# Patient Record
Sex: FEMALE | Race: BLACK OR AFRICAN AMERICAN | Employment: FULL TIME | URBAN - METROPOLITAN AREA
[De-identification: names, ages, dates, MRNs, and addresses within clinical notes are randomized per-mention and may not be internally consistent; named-entity substitution may affect disease eponyms.]

---

## 2010-10-27 LAB — MAMMOGRAPHY, EXTERNAL: NORMAL

## 2017-08-23 ENCOUNTER — OFFICE VISIT (OUTPATIENT)
Dept: FAMILY MEDICINE CLINIC | Age: 47
End: 2017-08-23

## 2017-08-23 VITALS
DIASTOLIC BLOOD PRESSURE: 78 MMHG | HEIGHT: 63 IN | WEIGHT: 184 LBS | HEART RATE: 61 BPM | SYSTOLIC BLOOD PRESSURE: 126 MMHG | OXYGEN SATURATION: 98 % | BODY MASS INDEX: 32.6 KG/M2

## 2017-08-23 DIAGNOSIS — G89.29 CHRONIC RIGHT-SIDED LOW BACK PAIN WITH RIGHT-SIDED SCIATICA: Primary | ICD-10-CM

## 2017-08-23 DIAGNOSIS — M25.561 CHRONIC PAIN OF RIGHT KNEE: ICD-10-CM

## 2017-08-23 DIAGNOSIS — G43.009 MIGRAINE WITHOUT AURA AND WITHOUT STATUS MIGRAINOSUS, NOT INTRACTABLE: ICD-10-CM

## 2017-08-23 DIAGNOSIS — M54.41 CHRONIC RIGHT-SIDED LOW BACK PAIN WITH RIGHT-SIDED SCIATICA: Primary | ICD-10-CM

## 2017-08-23 DIAGNOSIS — G89.29 CHRONIC PAIN OF RIGHT KNEE: ICD-10-CM

## 2017-08-23 PROCEDURE — 99204 OFFICE O/P NEW MOD 45 MIN: CPT | Performed by: FAMILY MEDICINE

## 2017-08-23 RX ORDER — SUMATRIPTAN 100 MG/1
100 TABLET, FILM COATED ORAL
Qty: 9 TABLET | Refills: 2 | Status: SHIPPED | OUTPATIENT
Start: 2017-08-23 | End: 2017-09-29 | Stop reason: SDUPTHER

## 2017-08-23 RX ORDER — METHYLPREDNISOLONE 4 MG/1
TABLET ORAL
Qty: 21 TABLET | Refills: 0 | Status: SHIPPED | OUTPATIENT
Start: 2017-08-23 | End: 2017-09-29

## 2017-08-23 RX ORDER — COVID-19 ANTIGEN TEST
3 KIT MISCELLANEOUS DAILY
COMMUNITY

## 2017-08-23 ASSESSMENT — PATIENT HEALTH QUESTIONNAIRE - PHQ9
2. FEELING DOWN, DEPRESSED OR HOPELESS: 0
SUM OF ALL RESPONSES TO PHQ9 QUESTIONS 1 & 2: 0
1. LITTLE INTEREST OR PLEASURE IN DOING THINGS: 0
SUM OF ALL RESPONSES TO PHQ QUESTIONS 1-9: 0

## 2017-08-23 ASSESSMENT — ENCOUNTER SYMPTOMS
SORE THROAT: 0
EYE ITCHING: 1
TROUBLE SWALLOWING: 0
SINUS PRESSURE: 1
GASTROINTESTINAL NEGATIVE: 1
BACK PAIN: 1
COUGH: 0

## 2017-09-06 ENCOUNTER — OFFICE VISIT (OUTPATIENT)
Dept: INTERNAL MEDICINE CLINIC | Age: 47
End: 2017-09-06

## 2017-09-06 VITALS
DIASTOLIC BLOOD PRESSURE: 70 MMHG | HEART RATE: 60 BPM | SYSTOLIC BLOOD PRESSURE: 120 MMHG | BODY MASS INDEX: 32.82 KG/M2 | TEMPERATURE: 98.4 F | HEIGHT: 63 IN | WEIGHT: 185.2 LBS

## 2017-09-06 DIAGNOSIS — R31.9 HEMATURIA: Primary | ICD-10-CM

## 2017-09-06 DIAGNOSIS — N30.01 ACUTE CYSTITIS WITH HEMATURIA: ICD-10-CM

## 2017-09-06 LAB
BILIRUBIN, POC: NORMAL
BLOOD URINE, POC: NORMAL
CLARITY, POC: NORMAL
COLOR, POC: YELLOW
GLUCOSE URINE, POC: NORMAL
KETONES, POC: NORMAL
LEUKOCYTE EST, POC: NORMAL
NITRITE, POC: NORMAL
PH, POC: 6
PROTEIN, POC: NORMAL
SPECIFIC GRAVITY, POC: 1005
UROBILINOGEN, POC: 0.2

## 2017-09-06 PROCEDURE — 81002 URINALYSIS NONAUTO W/O SCOPE: CPT | Performed by: NURSE PRACTITIONER

## 2017-09-06 PROCEDURE — 99213 OFFICE O/P EST LOW 20 MIN: CPT | Performed by: NURSE PRACTITIONER

## 2017-09-06 RX ORDER — FLUCONAZOLE 150 MG/1
150 TABLET ORAL ONCE
Qty: 1 TABLET | Refills: 0 | Status: SHIPPED | OUTPATIENT
Start: 2017-09-06 | End: 2017-09-06

## 2017-09-06 RX ORDER — CIPROFLOXACIN 250 MG/1
250 TABLET, FILM COATED ORAL 2 TIMES DAILY
Qty: 6 TABLET | Refills: 0 | Status: SHIPPED | OUTPATIENT
Start: 2017-09-06 | End: 2017-09-09

## 2017-09-06 ASSESSMENT — ENCOUNTER SYMPTOMS
APNEA: 0
SHORTNESS OF BREATH: 0
RESPIRATORY NEGATIVE: 1

## 2017-09-29 ENCOUNTER — OFFICE VISIT (OUTPATIENT)
Dept: FAMILY MEDICINE CLINIC | Age: 47
End: 2017-09-29

## 2017-09-29 VITALS
OXYGEN SATURATION: 98 % | WEIGHT: 181.2 LBS | HEART RATE: 68 BPM | BODY MASS INDEX: 32.11 KG/M2 | HEIGHT: 63 IN | SYSTOLIC BLOOD PRESSURE: 124 MMHG | DIASTOLIC BLOOD PRESSURE: 82 MMHG

## 2017-09-29 DIAGNOSIS — G43.009 MIGRAINE WITHOUT AURA AND WITHOUT STATUS MIGRAINOSUS, NOT INTRACTABLE: Primary | ICD-10-CM

## 2017-09-29 PROCEDURE — 99213 OFFICE O/P EST LOW 20 MIN: CPT | Performed by: FAMILY MEDICINE

## 2017-09-29 RX ORDER — SUMATRIPTAN 100 MG/1
100 TABLET, FILM COATED ORAL
Qty: 9 TABLET | Refills: 2 | Status: SHIPPED | OUTPATIENT
Start: 2017-09-29 | End: 2018-01-23 | Stop reason: ALTCHOICE

## 2017-10-10 ENCOUNTER — TELEPHONE (OUTPATIENT)
Dept: FAMILY MEDICINE CLINIC | Age: 47
End: 2017-10-10

## 2017-10-10 NOTE — TELEPHONE ENCOUNTER
Pt called to see if there is any way the FMLA forms can be filled out asap because they need to be turned in. They have been trying to fax them for over a week now but due to the fax machine being down it was received the first time it was faxed.

## 2017-10-12 ENCOUNTER — OFFICE VISIT (OUTPATIENT)
Dept: FAMILY MEDICINE CLINIC | Age: 47
End: 2017-10-12

## 2017-10-12 VITALS
SYSTOLIC BLOOD PRESSURE: 120 MMHG | DIASTOLIC BLOOD PRESSURE: 80 MMHG | WEIGHT: 180 LBS | HEART RATE: 47 BPM | OXYGEN SATURATION: 99 % | BODY MASS INDEX: 31.89 KG/M2 | HEIGHT: 63 IN

## 2017-10-12 DIAGNOSIS — J06.9 VIRAL URI: Primary | ICD-10-CM

## 2017-10-12 DIAGNOSIS — G43.009 MIGRAINE WITHOUT AURA AND WITHOUT STATUS MIGRAINOSUS, NOT INTRACTABLE: ICD-10-CM

## 2017-10-12 PROCEDURE — 99214 OFFICE O/P EST MOD 30 MIN: CPT | Performed by: FAMILY MEDICINE

## 2017-10-12 RX ORDER — FLUTICASONE PROPIONATE 50 MCG
2 SPRAY, SUSPENSION (ML) NASAL DAILY
Qty: 1 BOTTLE | Refills: 3 | Status: SHIPPED | OUTPATIENT
Start: 2017-10-12 | End: 2020-07-23

## 2017-10-12 NOTE — PROGRESS NOTES
Nails show no clubbing. Psychiatric: She has a normal mood and affect. Her behavior is normal.       Assessment:      1. Viral URI     2. Migraine without aura and without status migrainosus, not intractable            Plan:      1. Recommendations for viral upper respiratory infection given, including fluids, rest, salt water gargles, prn acetaminophen or ibuprofen for myalgias. Call back if new symptoms (especially high fever or shortness of breath) or worsening after a week.   2. Stable on current meds  fmla ppw filled out

## 2017-10-12 NOTE — LETTER
[facility] [address]  Phone: [phone]  Fax: [phone]    Martínez Harmon MD        October 12, 2017     Patient: Hailey Wallace   YOB: 1970   Date of Visit: 10/12/2017       To Whom It May Concern: It is my medical opinion that Hailey Wallace should be excused from work Monday through Friday due to illness. She may return 10/16/17  If you have any questions or concerns, please don't hesitate to call.     Sincerely,         Martínez Harmon MD

## 2017-10-13 ENCOUNTER — TELEPHONE (OUTPATIENT)
Dept: FAMILY MEDICINE CLINIC | Age: 47
End: 2017-10-13

## 2017-10-13 NOTE — TELEPHONE ENCOUNTER
Pt called to say [employer] didn't receive the original fax from 10/12. I re-faxed along with the documentation of when it was faxed.

## 2017-10-16 ENCOUNTER — TELEPHONE (OUTPATIENT)
Dept: FAMILY MEDICINE CLINIC | Age: 47
End: 2017-10-16

## 2017-10-28 LAB
BASOPHILS ABSOLUTE: NORMAL /ΜL
BASOPHILS RELATIVE PERCENT: NORMAL %
BUN BLDV-MCNC: 12 MG/DL
CALCIUM SERPL-MCNC: 9.8 MG/DL
CHLORIDE BLD-SCNC: 104 MMOL/L
CHOLESTEROL, TOTAL: 185 MG/DL
CHOLESTEROL/HDL RATIO: 3
CO2: NORMAL MMOL/L
CREAT SERPL-MCNC: 0.82 MG/DL
EOSINOPHILS ABSOLUTE: NORMAL /ΜL
EOSINOPHILS RELATIVE PERCENT: NORMAL %
GFR CALCULATED: NORMAL
GLUCOSE BLD-MCNC: 91 MG/DL
HCT VFR BLD CALC: 37.8 % (ref 36–46)
HDLC SERPL-MCNC: 61 MG/DL (ref 35–70)
HEMOGLOBIN: 13.6 G/DL (ref 12–16)
LDL CHOLESTEROL CALCULATED: 111 MG/DL (ref 0–160)
LYMPHOCYTES ABSOLUTE: NORMAL /ΜL
LYMPHOCYTES RELATIVE PERCENT: NORMAL %
MCH RBC QN AUTO: 28.2 PG
MCHC RBC AUTO-ENTMCNC: 36 G/DL
MCV RBC AUTO: 78 FL
MONOCYTES ABSOLUTE: NORMAL /ΜL
MONOCYTES RELATIVE PERCENT: NORMAL %
NEUTROPHILS ABSOLUTE: NORMAL /ΜL
NEUTROPHILS RELATIVE PERCENT: NORMAL %
PLATELET # BLD: 214 K/ΜL
PMV BLD AUTO: NORMAL FL
POTASSIUM SERPL-SCNC: 4.5 MMOL/L
RBC # BLD: NORMAL 10^6/ΜL
SODIUM BLD-SCNC: 143 MMOL/L
TRIGL SERPL-MCNC: 63 MG/DL
VLDLC SERPL CALC-MCNC: 13 MG/DL
WBC # BLD: 3.5 10^3/ML

## 2018-01-23 ENCOUNTER — OFFICE VISIT (OUTPATIENT)
Dept: FAMILY MEDICINE CLINIC | Age: 48
End: 2018-01-23

## 2018-01-23 VITALS
DIASTOLIC BLOOD PRESSURE: 82 MMHG | HEART RATE: 68 BPM | BODY MASS INDEX: 32.74 KG/M2 | OXYGEN SATURATION: 98 % | HEIGHT: 63 IN | RESPIRATION RATE: 12 BRPM | SYSTOLIC BLOOD PRESSURE: 126 MMHG | WEIGHT: 184.8 LBS

## 2018-01-23 DIAGNOSIS — M25.561 ACUTE PAIN OF RIGHT KNEE: Primary | ICD-10-CM

## 2018-01-23 PROCEDURE — 99214 OFFICE O/P EST MOD 30 MIN: CPT | Performed by: NURSE PRACTITIONER

## 2018-01-23 ASSESSMENT — ENCOUNTER SYMPTOMS
SINUS PRESSURE: 0
BACK PAIN: 0
DIARRHEA: 0
EYE PAIN: 0
APNEA: 0
EYE REDNESS: 0
CONSTIPATION: 0
WHEEZING: 0
SHORTNESS OF BREATH: 0
COUGH: 0
NAUSEA: 0
ABDOMINAL PAIN: 0
CHEST TIGHTNESS: 0
VOMITING: 0
RHINORRHEA: 0
BLOOD IN STOOL: 0
ABDOMINAL DISTENTION: 0

## 2018-01-23 NOTE — PROGRESS NOTES
No acute abnormality of the knee. Review of Systems   Constitutional: Negative for appetite change, chills, fatigue and fever. HENT: Negative for congestion, ear pain, rhinorrhea and sinus pressure. Eyes: Negative for pain, redness and visual disturbance. Respiratory: Negative for apnea, cough, chest tightness, shortness of breath and wheezing. Cardiovascular: Negative for chest pain, palpitations and leg swelling. Gastrointestinal: Negative for abdominal distention, abdominal pain, blood in stool, constipation, diarrhea, nausea and vomiting. Endocrine: Negative for cold intolerance, heat intolerance, polydipsia, polyphagia and polyuria. Genitourinary: Negative for difficulty urinating, dysuria, enuresis, frequency, hematuria and urgency. Musculoskeletal: Positive for arthralgias (right knee ). Negative for back pain, gait problem, joint swelling and neck pain. Skin: Negative for rash and wound. Allergic/Immunologic: Negative for environmental allergies, food allergies and immunocompromised state. Neurological: Negative for dizziness, tingling, syncope, light-headedness, numbness and headaches. Hematological: Negative for adenopathy. Does not bruise/bleed easily. Psychiatric/Behavioral: Negative for agitation, behavioral problems and confusion. The patient is not nervous/anxious. Physical Exam   Constitutional: She is oriented to person, place, and time. She appears well-developed and well-nourished. No distress. HENT:   Head: Normocephalic and atraumatic. Eyes: Conjunctivae are normal.   Neck: Normal range of motion. Neck supple. No tracheal deviation present. Cardiovascular: Normal rate, regular rhythm, normal heart sounds and intact distal pulses. Exam reveals no gallop and no friction rub. No murmur heard. Pulmonary/Chest: Effort normal and breath sounds normal. No respiratory distress. She has no wheezes. She has no rales. She exhibits no tenderness.

## 2018-01-24 ENCOUNTER — OFFICE VISIT (OUTPATIENT)
Dept: ORTHOPEDIC SURGERY | Age: 48
End: 2018-01-24

## 2018-01-24 VITALS
WEIGHT: 184 LBS | BODY MASS INDEX: 32.6 KG/M2 | HEIGHT: 63 IN | DIASTOLIC BLOOD PRESSURE: 82 MMHG | SYSTOLIC BLOOD PRESSURE: 119 MMHG | HEART RATE: 61 BPM

## 2018-01-24 DIAGNOSIS — M17.11 PRIMARY OSTEOARTHRITIS OF RIGHT KNEE: Primary | ICD-10-CM

## 2018-01-24 DIAGNOSIS — M25.561 RIGHT KNEE PAIN, UNSPECIFIED CHRONICITY: ICD-10-CM

## 2018-01-24 PROCEDURE — L1833 KO ADJ JNT POS R SUP PRE OTS: HCPCS | Performed by: ORTHOPAEDIC SURGERY

## 2018-01-24 PROCEDURE — 99243 OFF/OP CNSLTJ NEW/EST LOW 30: CPT | Performed by: ORTHOPAEDIC SURGERY

## 2018-01-24 RX ORDER — MELOXICAM 15 MG/1
15 TABLET ORAL DAILY
Qty: 30 TABLET | Refills: 1 | Status: SHIPPED | OUTPATIENT
Start: 2018-01-24 | End: 2020-07-23 | Stop reason: ALTCHOICE

## 2018-01-24 NOTE — PROGRESS NOTES
Lawerence Severs  [id number]  January 24, 2018    Chief Complaint   Patient presents with    Knee Pain     right knee pain since she woke up with swelling 1/19/17. Pain score 3/10       History: The patient is a 78-year-old female here today for evaluation regarding her right knee pain. She apparently went jogging on 1/17/18 and noted some swelling and pain in the knee approximately 2 days afterwards. She presented to the ER and was given a knee immobilizer after having x-rays taken. She reports this has provided her some support. She rates her knee pain 3/10 today. She has noted some aching discomfort and stiffness for the last few months after getting up from a seated position. 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 She has been taking 800 mg of ibuprofen at night with some relief. She denies prior formal treatments for this issue. This is a consult for right knee pain from Sunny Pereira CNP. The patient's  past medical history, medications, allergies,  family history, social history, and have been reviewed, and dated and are recorded in the chart. Pertinent items are noted in HPI. Review of systems reviewed from Pertinent History Form dated on 1/24/18 and available in the patient's chart under the Media tab. Vitals:  /82   Pulse 61   Ht 5' 3\" (1.6 m)   Wt 184 lb (83.5 kg)   Breastfeeding? No   BMI 32.59 kg/m²     Physical: Ms. Lawerence Severs appears well, she is in no apparent distress, she demonstrates appropriate mood & affect. She is alert and oriented to person, place and time. Examination of the right lower extremity reveals no pain with range of motion of the hip. She has generalized tenderness to palpation about the medial joint line of the right knee. Range of motion is from 0 degrees to 125 degrees. Strength is 4+ to 5/5 for all muscle groups about the right knee.  There is patellofemoral crepitus with range of motion of the right knee. Varus and valgus stressing of the knees reveals no evidence of instability. There is a trace effusion in the right knee. Anterior drawer and Lachman are negative bilaterally. Examination of the skin reveals no rashes, ulceration, or lesion, bilaterally in the lower extremities. Sensation to both lower extremities is grossly intact. Exam of both feet reveals pedal pulses intact and brisk cap refill. Patient is able to dorsiflex and wiggle all toes. Deep tendon reflexes of the lower extremities are normal and symmetric. Imagin views of the right knee obtained in the office today and additional 2 views obtained previously in the ER were reviewed. There is evidence of moderate osteoarthritis of the right knee. The changes are most severe within the medial compartment. Impression: #1 right knee osteoarthritis    Plan: She was instructed on activity modification and low impact exercising: Natural history and expected course discussed. Questions answered. Rest, ice, compression, and elevation (RICE) therapy. Reduction in offending activity. The patient was fitted for a short runner knee brace in the office today. [facility]. OTC analgesics as needed. If the patient continues to have pain, we will consider a cortisone injection in 4-6 weeks. Procedures    Breg Short Runner WrapAround Knee Brace     Patient was prescribed a Breg Shortrunner. The right knee will require stabilization / immobilization from this semi-rigid / rigid orthosis to improve their function. The orthosis will assist in protecting the affected area, provide functional support and facilitate healing. The patient was educated and fit by a healthcare professional with expert knowledge and specialization in brace application while under the direct supervision of the physician. Verbal and written instructions for the use of and application of this item were provided.    They were

## 2018-01-25 ENCOUNTER — TELEPHONE (OUTPATIENT)
Dept: ORTHOPEDIC SURGERY | Age: 48
End: 2018-01-25

## 2018-01-25 NOTE — TELEPHONE ENCOUNTER
1/25/18  [facility]   - NOT COVERED ITEM ON PATIENT'S POLICY - PER Alok Sánchez @ [facility]   - REF #[id number]  - NDS

## 2018-01-29 ENCOUNTER — TELEPHONE (OUTPATIENT)
Dept: FAMILY MEDICINE CLINIC | Age: 48
End: 2018-01-29

## 2018-01-30 ENCOUNTER — TELEPHONE (OUTPATIENT)
Dept: FAMILY MEDICINE CLINIC | Age: 48
End: 2018-01-30

## 2018-02-01 NOTE — TELEPHONE ENCOUNTER
I did not write a letter for the patient. This is the first time this message has been routed to me. Patient received a letter from [facility] excusing her from work starting 1/19/18 and returning to work on 1/29/18.  Are you approving patient to be off an additional 2 days, the 29th & 30th?

## 2018-02-01 NOTE — TELEPHONE ENCOUNTER
Patient also stated that she was at work so she didn't have time to argue with me, that she would call back later.

## 2018-02-06 ENCOUNTER — TELEPHONE (OUTPATIENT)
Dept: FAMILY MEDICINE CLINIC | Age: 48
End: 2018-02-06

## 2018-02-07 ENCOUNTER — HOSPITAL ENCOUNTER (OUTPATIENT)
Dept: OTHER | Age: 48
Discharge: HOME | End: 2018-02-28
Attending: NURSE PRACTITIONER | Admitting: NURSE PRACTITIONER

## 2018-02-07 ENCOUNTER — HOSPITAL ENCOUNTER (OUTPATIENT)
Dept: OTHER | Age: 48
Discharge: HOME | End: 2018-01-30
Attending: NURSE PRACTITIONER | Admitting: NURSE PRACTITIONER

## 2018-03-01 ENCOUNTER — HOSPITAL ENCOUNTER (OUTPATIENT)
Dept: OTHER | Age: 48
Discharge: HOME | End: 2018-03-31
Attending: NURSE PRACTITIONER | Admitting: NURSE PRACTITIONER

## 2018-05-22 ENCOUNTER — TELEPHONE (OUTPATIENT)
Dept: FAMILY MEDICINE CLINIC | Age: 48
End: 2018-05-22

## 2019-01-30 ENCOUNTER — TELEPHONE (OUTPATIENT)
Dept: FAMILY MEDICINE CLINIC | Age: 49
End: 2019-01-30

## 2019-02-13 ENCOUNTER — OFFICE VISIT (OUTPATIENT)
Dept: FAMILY MEDICINE CLINIC | Age: 49
End: 2019-02-13
Payer: COMMERCIAL

## 2019-02-13 VITALS
DIASTOLIC BLOOD PRESSURE: 80 MMHG | HEART RATE: 64 BPM | SYSTOLIC BLOOD PRESSURE: 128 MMHG | HEIGHT: 63 IN | WEIGHT: 193.6 LBS | BODY MASS INDEX: 34.3 KG/M2 | OXYGEN SATURATION: 96 %

## 2019-02-13 DIAGNOSIS — G43.009 MIGRAINE WITHOUT AURA AND WITHOUT STATUS MIGRAINOSUS, NOT INTRACTABLE: Primary | ICD-10-CM

## 2019-02-13 PROCEDURE — 99213 OFFICE O/P EST LOW 20 MIN: CPT | Performed by: FAMILY MEDICINE

## 2019-02-13 RX ORDER — TOPIRAMATE 25 MG/1
50 TABLET ORAL 2 TIMES DAILY
Qty: 120 TABLET | Refills: 3 | Status: SHIPPED | OUTPATIENT
Start: 2019-02-13 | End: 2019-10-23

## 2019-02-13 RX ORDER — RIZATRIPTAN BENZOATE 10 MG/1
10 TABLET, ORALLY DISINTEGRATING ORAL
Qty: 12 TABLET | Refills: 3 | Status: SHIPPED | OUTPATIENT
Start: 2019-02-13 | End: 2020-07-23 | Stop reason: ALTCHOICE

## 2019-02-13 ASSESSMENT — ENCOUNTER SYMPTOMS: TROUBLE SWALLOWING: 0

## 2019-02-15 ENCOUNTER — TELEPHONE (OUTPATIENT)
Dept: FAMILY MEDICINE CLINIC | Age: 49
End: 2019-02-15

## 2019-02-15 RX ORDER — TOPIRAMATE 50 MG/1
50 TABLET, FILM COATED ORAL 2 TIMES DAILY
Qty: 180 TABLET | Refills: 3 | Status: SHIPPED | OUTPATIENT
Start: 2019-02-15 | End: 2019-10-23

## 2019-02-27 ENCOUNTER — TELEPHONE (OUTPATIENT)
Dept: FAMILY MEDICINE CLINIC | Age: 49
End: 2019-02-27

## 2019-03-27 ENCOUNTER — OFFICE VISIT (OUTPATIENT)
Dept: FAMILY MEDICINE CLINIC | Age: 49
End: 2019-03-27
Payer: COMMERCIAL

## 2019-03-27 VITALS
WEIGHT: 197.4 LBS | SYSTOLIC BLOOD PRESSURE: 120 MMHG | HEART RATE: 67 BPM | BODY MASS INDEX: 34.98 KG/M2 | DIASTOLIC BLOOD PRESSURE: 84 MMHG | HEIGHT: 63 IN | OXYGEN SATURATION: 98 %

## 2019-03-27 DIAGNOSIS — G43.009 MIGRAINE WITHOUT AURA AND WITHOUT STATUS MIGRAINOSUS, NOT INTRACTABLE: Primary | ICD-10-CM

## 2019-03-27 DIAGNOSIS — R09.81 NASAL CONGESTION: ICD-10-CM

## 2019-03-27 PROCEDURE — 99214 OFFICE O/P EST MOD 30 MIN: CPT | Performed by: FAMILY MEDICINE

## 2019-03-27 ASSESSMENT — PATIENT HEALTH QUESTIONNAIRE - PHQ9
SUM OF ALL RESPONSES TO PHQ QUESTIONS 1-9: 0
SUM OF ALL RESPONSES TO PHQ9 QUESTIONS 1 & 2: 0
2. FEELING DOWN, DEPRESSED OR HOPELESS: 0
1. LITTLE INTEREST OR PLEASURE IN DOING THINGS: 0
SUM OF ALL RESPONSES TO PHQ QUESTIONS 1-9: 0

## 2019-10-23 ENCOUNTER — OFFICE VISIT (OUTPATIENT)
Dept: FAMILY MEDICINE CLINIC | Age: 49
End: 2019-10-23
Payer: COMMERCIAL

## 2019-10-23 VITALS
SYSTOLIC BLOOD PRESSURE: 118 MMHG | HEIGHT: 63 IN | WEIGHT: 196 LBS | DIASTOLIC BLOOD PRESSURE: 72 MMHG | BODY MASS INDEX: 34.73 KG/M2 | OXYGEN SATURATION: 99 % | HEART RATE: 76 BPM

## 2019-10-23 DIAGNOSIS — B96.89 ACUTE BACTERIAL SINUSITIS: Primary | ICD-10-CM

## 2019-10-23 DIAGNOSIS — J01.90 ACUTE BACTERIAL SINUSITIS: Primary | ICD-10-CM

## 2019-10-23 PROCEDURE — 99213 OFFICE O/P EST LOW 20 MIN: CPT | Performed by: FAMILY MEDICINE

## 2019-10-23 RX ORDER — AMOXICILLIN AND CLAVULANATE POTASSIUM 875; 125 MG/1; MG/1
1 TABLET, FILM COATED ORAL 2 TIMES DAILY
Qty: 20 TABLET | Refills: 0 | Status: SHIPPED | OUTPATIENT
Start: 2019-10-23 | End: 2019-11-02

## 2019-10-23 RX ORDER — METHYLPREDNISOLONE 4 MG/1
TABLET ORAL
Qty: 21 TABLET | Refills: 0 | Status: SHIPPED | OUTPATIENT
Start: 2019-10-23 | End: 2019-10-29

## 2020-01-14 ENCOUNTER — TELEPHONE (OUTPATIENT)
Dept: FAMILY MEDICINE CLINIC | Age: 50
End: 2020-01-14

## 2020-01-20 ENCOUNTER — TELEPHONE (OUTPATIENT)
Dept: FAMILY MEDICINE CLINIC | Age: 50
End: 2020-01-20

## 2020-03-02 ENCOUNTER — HOSPITAL ENCOUNTER (OUTPATIENT)
Dept: GENERAL RADIOLOGY | Age: 50
Discharge: HOME | End: 2020-03-02
Payer: COMMERCIAL

## 2020-03-02 ENCOUNTER — OFFICE VISIT (OUTPATIENT)
Dept: FAMILY MEDICINE CLINIC | Age: 50
End: 2020-03-02
Payer: COMMERCIAL

## 2020-03-02 ENCOUNTER — HOSPITAL ENCOUNTER (OUTPATIENT)
Age: 50
Discharge: HOME | End: 2020-03-02
Payer: COMMERCIAL

## 2020-03-02 VITALS
BODY MASS INDEX: 34.91 KG/M2 | SYSTOLIC BLOOD PRESSURE: 110 MMHG | HEART RATE: 75 BPM | DIASTOLIC BLOOD PRESSURE: 70 MMHG | TEMPERATURE: 97.9 F | OXYGEN SATURATION: 99 % | WEIGHT: 197 LBS | HEIGHT: 63 IN

## 2020-03-02 PROCEDURE — 99213 OFFICE O/P EST LOW 20 MIN: CPT | Performed by: FAMILY MEDICINE

## 2020-03-02 PROCEDURE — 71046 X-RAY EXAM CHEST 2 VIEWS: CPT

## 2020-03-02 RX ORDER — ALBUTEROL SULFATE 90 UG/1
2 AEROSOL, METERED RESPIRATORY (INHALATION) 4 TIMES DAILY PRN
Qty: 1 INHALER | Refills: 0 | Status: SHIPPED | OUTPATIENT
Start: 2020-03-02 | End: 2020-03-24

## 2020-03-02 NOTE — PROGRESS NOTES
nerve deficit. Psychiatric:         Behavior: Behavior normal.         Thought Content: Thought content normal.         Judgment: Judgment normal.         Assessment:       Diagnosis Orders   1. Rales  XR CHEST STANDARD (2 VW)   2. Cough  XR CHEST STANDARD (2 VW)          Plan:      chest X-ray to evaluate.  Prn albuterol        Itzel Son MD

## 2020-03-03 ENCOUNTER — TELEPHONE (OUTPATIENT)
Dept: FAMILY MEDICINE CLINIC | Age: 50
End: 2020-03-03

## 2020-03-24 RX ORDER — ALBUTEROL SULFATE 90 UG/1
AEROSOL, METERED RESPIRATORY (INHALATION)
Qty: 6.7 G | Refills: 1 | Status: SHIPPED | OUTPATIENT
Start: 2020-03-24 | End: 2020-12-29 | Stop reason: SDUPTHER

## 2020-03-24 NOTE — TELEPHONE ENCOUNTER
Last OV 3/2/2020   Next OV Visit date not found    Requested Prescriptions     Pending Prescriptions Disp Refills    albuterol sulfate  (90 Base) MCG/ACT inhaler [Pharmacy Med Name: ALBUTEROL HFA INH (200 PUFFS) 6.7GM] 6.7 g      Sig: INHALE 2 PUFFS INTO THE LUNGS FOUR TIMES DAILY AS NEEDED FOR WHEEZING

## 2020-04-24 ENCOUNTER — VIRTUAL VISIT (OUTPATIENT)
Dept: FAMILY MEDICINE CLINIC | Age: 50
End: 2020-04-24
Payer: COMMERCIAL

## 2020-04-24 ENCOUNTER — TELEPHONE (OUTPATIENT)
Dept: FAMILY MEDICINE CLINIC | Age: 50
End: 2020-04-24

## 2020-04-24 PROCEDURE — 99213 OFFICE O/P EST LOW 20 MIN: CPT | Performed by: FAMILY MEDICINE

## 2020-04-24 RX ORDER — VALACYCLOVIR HYDROCHLORIDE 1 G/1
1000 TABLET, FILM COATED ORAL 3 TIMES DAILY
Qty: 21 TABLET | Refills: 0 | Status: SHIPPED | OUTPATIENT
Start: 2020-04-24 | End: 2020-07-23 | Stop reason: ALTCHOICE

## 2020-04-24 RX ORDER — GABAPENTIN 300 MG/1
300 CAPSULE ORAL 3 TIMES DAILY
Qty: 90 CAPSULE | Refills: 0 | Status: SHIPPED | OUTPATIENT
Start: 2020-04-24 | End: 2020-07-23 | Stop reason: ALTCHOICE

## 2020-04-24 ASSESSMENT — PATIENT HEALTH QUESTIONNAIRE - PHQ9
2. FEELING DOWN, DEPRESSED OR HOPELESS: 0
SUM OF ALL RESPONSES TO PHQ9 QUESTIONS 1 & 2: 0
SUM OF ALL RESPONSES TO PHQ QUESTIONS 1-9: 0
1. LITTLE INTEREST OR PLEASURE IN DOING THINGS: 0
SUM OF ALL RESPONSES TO PHQ QUESTIONS 1-9: 0

## 2020-04-24 NOTE — PROGRESS NOTES
2020    TELEHEALTH EVALUATION -- Audio/Visual (During [facility]- public health emergency)    HPI:    Marlo Rocha (:  1970) has requested an audio/video evaluation for the following concern(s):    Rash started 3 days ago. Not much relief with baths. Calamine burns. Pinching, burning. Pain moderate to severe at times  Has never had this before    Review of Systems  No f/c  Prior to Visit Medications    Medication Sig Taking?  Authorizing Provider   albuterol sulfate  (90 Base) MCG/ACT inhaler INHALE 2 PUFFS INTO THE LUNGS FOUR TIMES DAILY AS NEEDED FOR WHEEZING  Patient not taking: Reported on 2020  Allen Sanchez, APRN - CNP   rizatriptan (MAXALT-MLT) 10 MG disintegrating tablet Take 1 tablet by mouth once as needed for Migraine May repeat in 2 hours if needed  Patient not taking: Reported on 10/23/2019  Neeta Solis MD   meloxicam (MOBIC) 15 MG tablet Take 1 tablet by mouth daily  Patient not taking: Reported on 10/23/2019  Geronimo Lion MD   fluticasone [facility]) 50 MCG/ACT nasal spray 2 sprays by Nasal route daily  Patient not taking: Reported on 10/23/2019  Neeta Solis MD   Naproxen Sodium (ALEVE) 220 MG CAPS Take 3 capsules by mouth daily  Historical Provider, MD       Social History     Tobacco Use    Smoking status: Never Smoker    Smokeless tobacco: Never Used   Substance Use Topics    Alcohol use: No    Drug use: No        No Known Allergies    PHYSICAL EXAMINATION:  [ INSTRUCTIONS:  \"[x]\" Indicates a positive item  \"[]\" Indicates a negative item  -- DELETE ALL ITEMS NOT EXAMINED]  Vital Signs: (As obtained by patient/caregiver or practitioner observation)    Blood pressure-  Heart rate-    Respiratory rate-    Temperature-  Pulse oximetry-     Constitutional: [] Appears well-developed and well-nourished [] No apparent distress      [] Abnormal-   Mental status  [] Alert and awake  [] Oriented to person/place/time []Able to follow commands

## 2020-06-25 ENCOUNTER — VIRTUAL VISIT (OUTPATIENT)
Dept: FAMILY MEDICINE CLINIC | Age: 50
End: 2020-06-25
Payer: COMMERCIAL

## 2020-06-25 PROCEDURE — 99212 OFFICE O/P EST SF 10 MIN: CPT | Performed by: FAMILY MEDICINE

## 2020-06-25 NOTE — PROGRESS NOTES
2020    TELEHEALTH EVALUATION -- Audio/Visual (During POEKA-47 public health emergency)    HPI:    Esther Sotomayor (:  1970) has requested an audio/video evaluation for the following concern(s):    Chief Complaint   Patient presents with    Other     Patient has been exposed to Algade 60 since Monday. Symptoms: fatigue, congestion only in AM.     Been in contact with 5 people with covid. Face congested, mostly on the left, cough  No shortness of breath or wheezing  Feeling tired and achy. Review of Systems  No f/c  Prior to Visit Medications    Medication Sig Taking? Authorizing Provider   valACYclovir (VALTREX) 1 g tablet Take 1 tablet by mouth 3 times daily  Patient not taking: Reported on 2020  Jennifer Bishop MD   gabapentin (NEURONTIN) 300 MG capsule Take 1 capsule by mouth 3 times daily for 30 days.  Intended supply: 30 days  Jennifer Bishop MD   albuterol sulfate  (90 Base) MCG/ACT inhaler INHALE 2 PUFFS INTO THE LUNGS FOUR TIMES DAILY AS NEEDED FOR WHEEZING  Patient not taking: Reported on 2020  Max Care, APRN - CNP   rizatriptan (MAXALT-MLT) 10 MG disintegrating tablet Take 1 tablet by mouth once as needed for Migraine May repeat in 2 hours if needed  Patient not taking: Reported on 10/23/2019  Jennifer Bishop MD   meloxicam (MOBIC) 15 MG tablet Take 1 tablet by mouth daily  Patient not taking: Reported on 10/23/2019  Jason Castaneda MD   fluticasone [facility]) 50 MCG/ACT nasal spray 2 sprays by Nasal route daily  Patient not taking: Reported on 10/23/2019  Jennifer Bishop MD   Naproxen Sodium (ALEVE) 220 MG CAPS Take 3 capsules by mouth daily  Historical Provider, MD       Social History     Tobacco Use    Smoking status: Never Smoker    Smokeless tobacco: Never Used   Substance Use Topics    Alcohol use: No    Drug use: No        No Known Allergies    PHYSICAL EXAMINATION:  [ INSTRUCTIONS:  \"[x]\" Indicates a positive item  \"[]\" emergency declaration under the [facility], [address] [place] [address] authority and the [employer] and [employer] Act, this Virtual Visit was conducted with patient's (and/or legal guardian's) consent, to reduce the patient's risk of exposure to COVID-19 and provide necessary medical care. The patient (and/or legal guardian) has also been advised to contact this office for worsening conditions or problems, and seek emergency medical treatment and/or call 911 if deemed necessary. Patient identification was verified at the start of the visit: Yes    Total time spent on this encounter: Not billed by time    Services were provided through a video synchronous discussion virtually to substitute for in-person clinic visit. Patient and provider were located at their individual homes. --Ciara Lugo MD on 6/25/2020 at 9:02 AM    An electronic signature was used to authenticate this note.

## 2020-06-26 ENCOUNTER — TELEPHONE (OUTPATIENT)
Dept: FAMILY MEDICINE CLINIC | Age: 50
End: 2020-06-26

## 2020-06-29 NOTE — TELEPHONE ENCOUNTER
Pt informed and she requested that letter be faxed to her. Letter was done on 6.25.20  Letter emailed.

## 2020-07-13 ENCOUNTER — TELEPHONE (OUTPATIENT)
Dept: FAMILY MEDICINE CLINIC | Age: 50
End: 2020-07-13

## 2020-07-13 NOTE — TELEPHONE ENCOUNTER
Letter sent 6/25 to de off 14 days since she refused testing, if still having symptoms should she be tested

## 2020-07-13 NOTE — TELEPHONE ENCOUNTER
Patient requesting work excuse be extended until 7/16 to go back to work on 7/17. Patient is still experiencing congestion, sinus pressure, weakness and fatigue.     Patient would like this note emailed to [email or web address].

## 2020-07-17 NOTE — TELEPHONE ENCOUNTER
Pt rc and would like to know if her note to be off work could be extended for another week. If pt needs to be seen, please advise. Pt states she's still suffering w/nasal and chest congestion which causes chest pain. Pt also has mucus when she coughs, and migraines. Please advise.

## 2020-07-17 NOTE — TELEPHONE ENCOUNTER
Pt advised would need to be seen. that extension could not be given at th is time  Pt has been off since 6/24, refused covid testing. Pt was advised to try flonase and mucinex.  Pt scheduled VV    FYI

## 2020-07-23 ENCOUNTER — VIRTUAL VISIT (OUTPATIENT)
Dept: FAMILY MEDICINE CLINIC | Age: 50
End: 2020-07-23
Payer: COMMERCIAL

## 2020-07-23 PROCEDURE — 99213 OFFICE O/P EST LOW 20 MIN: CPT | Performed by: FAMILY MEDICINE

## 2020-07-23 RX ORDER — FLUTICASONE PROPIONATE 50 MCG
2 SPRAY, SUSPENSION (ML) NASAL DAILY
Qty: 1 BOTTLE | Refills: 3 | Status: SHIPPED | OUTPATIENT
Start: 2020-07-23 | End: 2020-10-29 | Stop reason: SDUPTHER

## 2020-07-23 NOTE — PROGRESS NOTES
2020    TELEHEALTH EVALUATION -- Audio/Visual (During PKFJW-16 public health emergency)    HPI:    Miki Alvarez (:  1970) has requested an audio/video evaluation for the following concern(s):      Chief Complaint   Patient presents with    Cough     Patient complains of cough, congestion, had covid testing on  (hasn't had results)    Diarrhea    Abdominal Pain    Fatigue     Really bad diarrhea last week. Really achy. Got the test . Still having abd pain. Diarrhea decreased but not eating much. Some relief with cough with inhaler. Taking aleve hs. Review of Systems   Constitutional: Positive for fatigue. Negative for fever. Prior to Visit Medications    Medication Sig Taking? Authorizing Provider   albuterol sulfate  (90 Base) MCG/ACT inhaler INHALE 2 PUFFS INTO THE LUNGS FOUR TIMES DAILY AS NEEDED FOR WHEEZING Yes AIDE Lee - CNP   Naproxen Sodium (ALEVE) 220 MG CAPS Take 3 capsules by mouth daily Yes Historical Provider, MD       Social History     Tobacco Use    Smoking status: Never Smoker    Smokeless tobacco: Never Used   Substance Use Topics    Alcohol use: No    Drug use: No        No Known Allergies    PHYSICAL EXAMINATION:  [ INSTRUCTIONS:  \"[x]\" Indicates a positive item  \"[]\" Indicates a negative item  -- DELETE ALL ITEMS NOT EXAMINED]  Vital Signs: (As obtained by patient/caregiver or practitioner observation)    Blood pressure-  Heart rate-    Respiratory rate-    Temperature-  Pulse oximetry-     Constitutional: [] Appears well-developed and well-nourished [] No apparent distress      [] Abnormal-   Mental status  [] Alert and awake  [] Oriented to person/place/time []Able to follow commands      Eyes:  EOM    []  Normal  [] Abnormal-  Sclera  []  Normal  [] Abnormal -         Discharge []  None visible  [] Abnormal -    HENT:   [] Normocephalic, atraumatic.   [] Abnormal   [] Mouth/Throat: Mucous membranes are moist. External Ears [] Normal  [] Abnormal-     Neck: [] No visualized mass     Pulmonary/Chest: [] Respiratory effort normal.  [] No visualized signs of difficulty breathing or respiratory distress        [] Abnormal-      Musculoskeletal:   [] Normal gait with no signs of ataxia         [] Normal range of motion of neck        [] Abnormal-       Neurological:        [] No Facial Asymmetry (Cranial nerve 7 motor function) (limited exam to video visit)          [] No gaze palsy        [] Abnormal-         Skin:        [] No significant exanthematous lesions or discoloration noted on facial skin         [] Abnormal-            Psychiatric:       [] Normal Affect [] No Hallucinations        [] Abnormal-     Other pertinent observable physical exam findings-     ASSESSMENT/PLAN:   Diagnosis Orders   1. Diarrhea, unspecified type     2. Nasal congestion       Start flonase and sudafed  Switch to pedialyte, focus on hydration  Advised to call back directly if there are further questions, or if these symptoms fail to improve as anticipated or worsen. Work note extended    No follow-ups on file. Tray Borrero is a 44 y.o. female being evaluated by a Virtual Visit (video visit) encounter to address concerns as mentioned above. A caregiver was present when appropriate. Due to this being a TeleHealth encounter (During XGCTQ-13 public health emergency), evaluation of the following organ systems was limited: Vitals/Constitutional/EENT/Resp/CV/GI//MS/Neuro/Skin/Heme-Lymph-Imm. Pursuant to the emergency declaration under the [address] [place] [address], [address] [place] [address] authority and the [email or web address] and [employer] Act, this Virtual Visit was conducted with patient's (and/or legal guardian's) consent, to reduce the patient's risk of exposure to COVID-19 and provide necessary medical care.   The patient (and/or legal guardian) has also been advised to contact this office for worsening conditions or problems, and seek emergency medical treatment and/or call 911 if deemed necessary. Patient identification was verified at the start of the visit: Yes    Total time spent on this encounter: Not billed by time    Services were provided through a video synchronous discussion virtually to substitute for in-person clinic visit. Patient and provider were located at their individual homes. --Cindy Whalen MD on 7/23/2020 at 9:00 AM    An electronic signature was used to authenticate this note.

## 2020-07-24 ENCOUNTER — TELEPHONE (OUTPATIENT)
Dept: FAMILY MEDICINE CLINIC | Age: 50
End: 2020-07-24

## 2020-07-24 NOTE — TELEPHONE ENCOUNTER
Pt called requesting that the letter from Dr. Lorenzo Zhang be faxed to her employer, [employer]. Britney Rios @ [phone]. I faxed letter today at 10:32 and scanned into pt's chart.

## 2020-07-27 ENCOUNTER — TELEPHONE (OUTPATIENT)
Dept: FAMILY MEDICINE CLINIC | Age: 50
End: 2020-07-27

## 2020-07-27 NOTE — TELEPHONE ENCOUNTER
Pt called stating her employer never received her work note and would like to know if it can be d-mailed to her at [email or web address]. I let pt know that we normally don't send medical information through e-mail but pt states she's had it done before. I'll try to refax it to her employer one more time. Letter re-faxed at 12:37.

## 2020-10-27 ENCOUNTER — NURSE TRIAGE (OUTPATIENT)
Dept: OTHER | Facility: CLINIC | Age: 50
End: 2020-10-27

## 2020-10-27 NOTE — TELEPHONE ENCOUNTER
Reason for Disposition   Patient wants to be seen    Answer Assessment - Initial Assessment Questions  1. LOCATION: \"Where does it hurt? \"       Near left eye    2. ONSET: \"When did the headache start? \" (Minutes, hours or days)       24 hours ago    3. PATTERN: \"Does the pain come and go, or has it been constant since it started? \"      Comes and   goes  4. SEVERITY: \"How bad is the pain? \" and \"What does it keep you from doing? \"  (e.g., Scale 1-10; mild, moderate, or severe)    - MILD (1-3): doesn't interfere with normal activities     - MODERATE (4-7): interferes with normal activities or awakens from sleep     - SEVERE (8-10): excruciating pain, unable to do any normal activities         Moderate    5. RECURRENT SYMPTOM: \"Have you ever had headaches before? \" If so, ask: \"When was the last time? \" and \"What happened that time? \"       Yes, migraines    6. CAUSE: \"What do you think is causing the headache? \"      Unknown    7. MIGRAINE: \"Have you been diagnosed with migraine headaches? \" If so, ask: \"Is this headache similar? \"      Yes, this feels similar    8. HEAD INJURY: \"Has there been any recent injury to the head? \"       Denies    9. OTHER SYMPTOMS: \"Do you have any other symptoms? \" (fever, stiff neck, eye pain, sore throat, cold symptoms)     Cough, diarrhea, runny nose    10. PREGNANCY: \"Is there any chance you are pregnant? \" \"When was your last menstrual period? \"        n/a    Protocols used: HEADACHE-ADULT-[place]

## 2020-10-29 ENCOUNTER — VIRTUAL VISIT (OUTPATIENT)
Dept: FAMILY MEDICINE CLINIC | Age: 50
End: 2020-10-29
Payer: COMMERCIAL

## 2020-10-29 ENCOUNTER — TELEPHONE (OUTPATIENT)
Dept: PRIMARY CARE CLINIC | Age: 50
End: 2020-10-29

## 2020-10-29 PROCEDURE — 99213 OFFICE O/P EST LOW 20 MIN: CPT | Performed by: FAMILY MEDICINE

## 2020-10-29 RX ORDER — GUAIFENESIN, PSEUDOEPHEDRINE HYDROCHLORIDE 600; 60 MG/1; MG/1
1 TABLET, EXTENDED RELEASE ORAL EVERY 12 HOURS
Qty: 14 TABLET | Refills: 1 | Status: SHIPPED | OUTPATIENT
Start: 2020-10-29 | End: 2020-11-05

## 2020-10-29 RX ORDER — FLUTICASONE PROPIONATE 50 MCG
2 SPRAY, SUSPENSION (ML) NASAL DAILY
Qty: 1 BOTTLE | Refills: 3 | Status: SHIPPED | OUTPATIENT
Start: 2020-10-29 | End: 2022-10-21 | Stop reason: ALTCHOICE

## 2020-10-29 NOTE — LETTER
[facility]  [address] [place] [address],[address]  Phone: [phone]  Fax: [phone]    Jagdeep Mercado MD        October 29, 2020     Patient: Kj Parrish   YOB: 1970   Date of Visit: 10/29/2020       To Whom It May Concern: It is my medical opinion that Kj Parrish should be excused 10/26-11/4. She may return 11/5 is symptoms improved. If you have any questions or concerns, please don't hesitate to call.     Sincerely,         Jagdeep Mercado MD

## 2020-10-29 NOTE — Clinical Note
[facility] email letter written to [email or web address]

## 2020-10-29 NOTE — PROGRESS NOTES
10/29/2020    TELEHEALTH EVALUATION -- Audio/Visual (During HZKMD-48 public health emergency)    HPI:    Denae Rendon (:  1970) has requested an audio/video evaluation for the following concern(s):    Chief Complaint   Patient presents with    Headache     Patient complains of headache, nasal drainage, cough, and diarrhea since . Patient has not checked temp but does not believe she has fever. Treating with excedrin migraine. Diarrhea started . Nasal drainage. Thinks cough is from drainage. No shortness of breath   Pain on left side of face with migraine    Review of Systems   Constitutional: Negative. Prior to Visit Medications    Medication Sig Taking?  Authorizing Provider   fluticasone (FLONASE) 50 MCG/ACT nasal spray 2 sprays by Nasal route daily  Patient not taking: Reported on 10/29/2020  Kj Wright MD   albuterol sulfate  (90 Base) MCG/ACT inhaler INHALE 2 PUFFS INTO THE LUNGS FOUR TIMES DAILY AS NEEDED FOR WHEEZING  Patient not taking: Reported on 10/29/2020  AIDE Bains - CNP   Naproxen Sodium (ALEVE) 220 MG CAPS Take 3 capsules by mouth daily  Historical Provider, MD       Social History     Tobacco Use    Smoking status: Never Smoker    Smokeless tobacco: Never Used   Substance Use Topics    Alcohol use: No    Drug use: No        No Known Allergies    PHYSICAL EXAMINATION:  [ INSTRUCTIONS:  \"[x]\" Indicates a positive item  \"[]\" Indicates a negative item  -- DELETE ALL ITEMS NOT EXAMINED]  Vital Signs: (As obtained by patient/caregiver or practitioner observation)    Blood pressure-  Heart rate-    Respiratory rate-    Temperature-  Pulse oximetry-     Constitutional: [x] Appears well-developed and well-nourished [x] No apparent distress      [] Abnormal-   Mental status  [] Alert and awake  [] Oriented to person/place/time []Able to follow commands      Eyes:  EOM    []  Normal  [] Abnormal-  Sclera  []  Normal  [] Abnormal - Discharge []  None visible  [] Abnormal -    HENT:   [] Normocephalic, atraumatic. [] Abnormal   [] Mouth/Throat: Mucous membranes are moist.     External Ears [] Normal  [] Abnormal-     Neck: [] No visualized mass     Pulmonary/Chest: [] Respiratory effort normal.  [] No visualized signs of difficulty breathing or respiratory distress        [] Abnormal-      Musculoskeletal:   [] Normal gait with no signs of ataxia         [] Normal range of motion of neck        [] Abnormal-       Neurological:        [] No Facial Asymmetry (Cranial nerve 7 motor function) (limited exam to video visit)          [] No gaze palsy        [] Abnormal-         Skin:        [] No significant exanthematous lesions or discoloration noted on facial skin         [] Abnormal-            Psychiatric:       [] Normal Affect [] No Hallucinations        [] Abnormal-     Other pertinent observable physical exam findings-     ASSESSMENT/PLAN:  1. Viral syndrome  flonase and decongestant  Can try pepto or imodium  Off work for 10 days and symptoms improving-note written      No follow-ups on file. Brenna Chaudhry is a 44 y.o. female being evaluated by a Virtual Visit (video visit) encounter to address concerns as mentioned above. A caregiver was present when appropriate. Due to this being a TeleHealth encounter ([facility]), evaluation of the following organ systems was limited: Vitals/Constitutional/EENT/Resp/CV/GI//MS/Neuro/Skin/Heme-Lymph-Imm. Pursuant to the emergency declaration under the [facility], [address] [place] [address] and the [employer] and [employer] Act, this Virtual Visit was conducted with patient's (and/or legal guardian's) consent, to reduce the patient's risk of exposure to COVID-19 and provide necessary medical care.   The patient (and/or legal guardian) has also been advised to contact this office for worsening

## 2020-12-29 ENCOUNTER — VIRTUAL VISIT (OUTPATIENT)
Dept: FAMILY MEDICINE CLINIC | Age: 50
End: 2020-12-29
Payer: COMMERCIAL

## 2020-12-29 PROCEDURE — 99213 OFFICE O/P EST LOW 20 MIN: CPT | Performed by: FAMILY MEDICINE

## 2020-12-29 RX ORDER — ALBUTEROL SULFATE 90 UG/1
2 AEROSOL, METERED RESPIRATORY (INHALATION) EVERY 4 HOURS PRN
Qty: 18 G | Refills: 1 | Status: SHIPPED | OUTPATIENT
Start: 2020-12-29 | End: 2021-07-23 | Stop reason: ALTCHOICE

## 2020-12-29 NOTE — PROGRESS NOTES
2020    TELEHEALTH EVALUATION -- Audio/Visual (During PGIJQ-65 public health emergency)    HPI:    Jasmina Floyd (:  1970) has requested an audio/video evaluation for the following concern(s):    Chief Complaint   Patient presents with    Pharyngitis     Patient complains of a sore throat for 3 weeks, negative for COVID-19. Started experiencing diarrhea yesterday, also has fatigue and shortness of breath. Went to the er  due to shortness of breath and chest pain   evaluation was negative. sore throat for 3 weeks. No sinus pain or pressure. Last day of work was . Review of Systems   Respiratory: Negative for shortness of breath. Prior to Visit Medications    Medication Sig Taking?  Authorizing Provider   fluticasone (FLONASE) 50 MCG/ACT nasal spray 2 sprays by Nasal route daily  Patient not taking: Reported on 2020  Nely Moreno MD   albuterol sulfate  (90 Base) MCG/ACT inhaler INHALE 2 PUFFS INTO THE LUNGS FOUR TIMES DAILY AS NEEDED FOR WHEEZING  Patient not taking: Reported on 10/29/2020  AIDE Young - CNP   Naproxen Sodium (ALEVE) 220 MG CAPS Take 3 capsules by mouth daily  Historical Provider, MD       Social History     Tobacco Use    Smoking status: Never Smoker    Smokeless tobacco: Never Used   Substance Use Topics    Alcohol use: No    Drug use: No        No Known Allergies    PHYSICAL EXAMINATION:  [ INSTRUCTIONS:  \"[x]\" Indicates a positive item  \"[]\" Indicates a negative item  -- DELETE ALL ITEMS NOT EXAMINED]  Vital Signs: (As obtained by patient/caregiver or practitioner observation)    Blood pressure-  Heart rate-    Respiratory rate-    Temperature-  Pulse oximetry-     Constitutional: [] Appears well-developed and well-nourished [] No apparent distress      [] Abnormal-   Mental status  [] Alert and awake  [] Oriented to person/place/time []Able to follow commands      Eyes:  EOM    []  Normal  [] Abnormal- Sclera  []  Normal  [] Abnormal -         Discharge []  None visible  [] Abnormal -    HENT:   [] Normocephalic, atraumatic. [] Abnormal   [] Mouth/Throat: Mucous membranes are moist.     External Ears [] Normal  [] Abnormal-     Neck: [] No visualized mass     Pulmonary/Chest: [] Respiratory effort normal.  [] No visualized signs of difficulty breathing or respiratory distress        [] Abnormal-      Musculoskeletal:   [] Normal gait with no signs of ataxia         [] Normal range of motion of neck        [] Abnormal-       Neurological:        [] No Facial Asymmetry (Cranial nerve 7 motor function) (limited exam to video visit)          [] No gaze palsy        [] Abnormal-         Skin:        [] No significant exanthematous lesions or discoloration noted on facial skin         [] Abnormal-            Psychiatric:       [] Normal Affect [] No Hallucinations        [] Abnormal-     Other pertinent observable physical exam findings-     ASSESSMENT/PLAN:  1. Viral syndrome  Reviewed supportive care. Note for work until symptoms improving      No follow-ups on file. Edie Harris is a 44 y.o. female being evaluated by a Virtual Visit (video visit) encounter to address concerns as mentioned above. A caregiver was present when appropriate. Due to this being a TeleHealth encounter (During ZIKAE-63 public health emergency), evaluation of the following organ systems was limited: Vitals/Constitutional/EENT/Resp/CV/GI//MS/Neuro/Skin/Heme-Lymph-Imm. Pursuant to the emergency declaration under the [address] [place] [address], [address] [place] [address] and the [employer] and [employer] Act, this Virtual Visit was conducted with patient's (and/or legal guardian's) consent, to reduce the patient's risk of exposure to COVID-19 and provide necessary medical care. The patient (and/or legal guardian) has also been advised to contact this office for worsening conditions or problems, and seek emergency medical treatment and/or call 911 if deemed necessary. Patient identification was verified at the start of the visit: Yes    Total time spent on this encounter: Not billed by time    Services were provided through a video synchronous discussion virtually to substitute for in-person clinic visit. Patient and provider were located at their individual homes. --Eugenie Lin MD on 12/29/2020 at 3:43 PM    An electronic signature was used to authenticate this note.

## 2020-12-31 ASSESSMENT — PATIENT HEALTH QUESTIONNAIRE - PHQ9
SUM OF ALL RESPONSES TO PHQ QUESTIONS 1-9: 0
SUM OF ALL RESPONSES TO PHQ9 QUESTIONS 1 & 2: 0
1. LITTLE INTEREST OR PLEASURE IN DOING THINGS: 0
2. FEELING DOWN, DEPRESSED OR HOPELESS: 0
SUM OF ALL RESPONSES TO PHQ QUESTIONS 1-9: 0
SUM OF ALL RESPONSES TO PHQ QUESTIONS 1-9: 0

## 2021-01-05 ENCOUNTER — TELEPHONE (OUTPATIENT)
Dept: FAMILY MEDICINE CLINIC | Age: 51
End: 2021-01-05

## 2021-01-05 NOTE — TELEPHONE ENCOUNTER
Patient is requesting work note to be extended through 1/18/2021. Original note stated 12/17-1/4. Patient states that she did not test positive for COVID. Current symptoms: sore throat, night sweats, fatigue, no energy, headache. Inhaler seems to help during the day. States that Dr. Alex Yancey said that he would extend the work note if she wasn't feeling any better.

## 2021-01-05 NOTE — TELEPHONE ENCOUNTER
[id number] Latrice Kennedy for note, but I always caution people with extended leaves that it doesn't necessarily protect their jobs

## 2021-01-05 NOTE — LETTER
[facility]  [address] [place] [address],[address]  Phone: [phone]  Fax: [phone]    Nicky Khalil MD        January 5, 2021     Patient: Sybil Lai   YOB: 1970       To Whom it May Concern:    Sybil Lai may return to work on 01/18/2021. Please excuse her from 12/17/20-01/18/21 due to illness. If you have any questions or concerns, please don't hesitate to call.     Sincerely,         Nicky Khalil MD

## 2021-01-13 ENCOUNTER — TELEPHONE (OUTPATIENT)
Dept: FAMILY MEDICINE CLINIC | Age: 51
End: 2021-01-13

## 2021-01-13 NOTE — TELEPHONE ENCOUNTER
Pt is scheduled for paperwork, she was seen 12/29 and returning to work on Monday, she wanted to know if she really needed appointment  Please advise

## 2021-01-15 ENCOUNTER — VIRTUAL VISIT (OUTPATIENT)
Dept: FAMILY MEDICINE CLINIC | Age: 51
End: 2021-01-15
Payer: COMMERCIAL

## 2021-01-15 DIAGNOSIS — U07.1 COVID-19: Primary | ICD-10-CM

## 2021-01-15 PROCEDURE — 99212 OFFICE O/P EST SF 10 MIN: CPT | Performed by: FAMILY MEDICINE

## 2021-01-15 ASSESSMENT — PATIENT HEALTH QUESTIONNAIRE - PHQ9
1. LITTLE INTEREST OR PLEASURE IN DOING THINGS: 0
2. FEELING DOWN, DEPRESSED OR HOPELESS: 0
SUM OF ALL RESPONSES TO PHQ QUESTIONS 1-9: 0

## 2021-01-15 NOTE — PROGRESS NOTES
Patricia Titus (:  1970) is a 44 y.o. female,Established patient, here for evaluation of the following chief complaint(s): Check-Up (discuss short term disability forms )      ASSESSMENT/PLAN:  1. COVID-19    Slow recovery from covid  Anticipate return . STD filled out  No follow-ups on file. SUBJECTIVE/OBJECTIVE:  HPI   Pt is a of 44 y.o. female comes in today with   Chief Complaint   Patient presents with   [facility] Check-Up     discuss short term disability forms      Sick since . Returning to work . Still using inhaler. Still having intermittent drainage. Not as much shortness of breath   Works at [employer] and has been rampant there. Review of Systems    Patient-Reported Vitals 1/15/2021   Patient-Reported Weight 165lb   Patient-Reported Height 5 3   Patient-Reported Temperature -        Physical Exam                Patricia Titus is a 44 y.o. female being evaluated by a Virtual Visit (video visit) encounter to address concerns as mentioned above. A caregiver was present when appropriate. Due to this being a TeleHealth encounter (During - public health emergency), evaluation of the following organ systems was limited: Vitals/Constitutional/EENT/Resp/CV/GI//MS/Neuro/Skin/Heme-Lymph-Imm. Pursuant to the emergency declaration under the [address] [place] [address], [address] [place] [address] authority and the [employer] and [employer] Act, this Virtual Visit was conducted with patient's (and/or legal guardian's) consent, to reduce the patient's risk of exposure to COVID-19 and provide necessary medical care. The patient (and/or legal guardian) has also been advised to contact this office for worsening conditions or problems, and seek emergency medical treatment and/or call 911 if deemed necessary.      Patient identification was verified at the start of the visit: Yes Services were provided through a video synchronous discussion virtually to substitute for in-person clinic visit. Patient was located at home and provider was located in office or at home. An electronic signature was used to authenticate this note.     --Nicky Khalil MD

## 2021-02-02 ENCOUNTER — TELEPHONE (OUTPATIENT)
Dept: FAMILY MEDICINE CLINIC | Age: 51
End: 2021-02-02

## 2021-02-02 NOTE — TELEPHONE ENCOUNTER
----- Message from Mismi sent at 2/2/2021 10:30 AM EST -----  Subject: Message to Provider    QUESTIONS  Information for Provider? Patient is not feeling well enough to go back to work yet. She stated her return to work date was for yesterday but she would like an extension until 2/8/2021. She said she can just print it from [employer]. Please call if this cannot be done for any reason. ---------------------------------------------------------------------------  --------------  Jason Daly INFO  What is the best way for the office to contact you? OK to leave message on   voicemail  Preferred Call Back Phone Number? [phone]  ---------------------------------------------------------------------------  --------------  SCRIPT ANSWERS  Relationship to Patient?  Self

## 2021-07-21 ENCOUNTER — APPOINTMENT (OUTPATIENT)
Dept: GENERAL RADIOLOGY | Age: 51
End: 2021-07-21
Payer: COMMERCIAL

## 2021-07-21 ENCOUNTER — HOSPITAL ENCOUNTER (EMERGENCY)
Age: 51
Discharge: HOME | End: 2021-07-21
Attending: EMERGENCY MEDICINE
Payer: COMMERCIAL

## 2021-07-21 VITALS
OXYGEN SATURATION: 100 % | TEMPERATURE: 97.6 F | SYSTOLIC BLOOD PRESSURE: 121 MMHG | HEART RATE: 62 BPM | RESPIRATION RATE: 14 BRPM | WEIGHT: 165.4 LBS | HEIGHT: 63 IN | BODY MASS INDEX: 29.3 KG/M2 | DIASTOLIC BLOOD PRESSURE: 60 MMHG

## 2021-07-21 DIAGNOSIS — S70.01XA CONTUSION OF RIGHT HIP, INITIAL ENCOUNTER: Primary | ICD-10-CM

## 2021-07-21 DIAGNOSIS — S39.012A LUMBAR STRAIN, INITIAL ENCOUNTER: ICD-10-CM

## 2021-07-21 PROCEDURE — 99283 EMERGENCY DEPT VISIT LOW MDM: CPT

## 2021-07-21 PROCEDURE — 73502 X-RAY EXAM HIP UNI 2-3 VIEWS: CPT

## 2021-07-21 PROCEDURE — 6370000000 HC RX 637 (ALT 250 FOR IP): Performed by: EMERGENCY MEDICINE

## 2021-07-21 PROCEDURE — 72100 X-RAY EXAM L-S SPINE 2/3 VWS: CPT

## 2021-07-21 RX ORDER — METHOCARBAMOL 750 MG/1
750 TABLET, FILM COATED ORAL 3 TIMES DAILY PRN
Qty: 15 TABLET | Refills: 0 | Status: SHIPPED | OUTPATIENT
Start: 2021-07-21 | End: 2021-07-26

## 2021-07-21 RX ORDER — IBUPROFEN 800 MG/1
800 TABLET ORAL ONCE
Status: COMPLETED | OUTPATIENT
Start: 2021-07-21 | End: 2021-07-21

## 2021-07-21 RX ORDER — IBUPROFEN 800 MG/1
800 TABLET ORAL EVERY 8 HOURS PRN
Qty: 20 TABLET | Refills: 0 | Status: SHIPPED | OUTPATIENT
Start: 2021-07-21 | End: 2021-09-29

## 2021-07-21 RX ADMIN — IBUPROFEN 800 MG: 800 TABLET, FILM COATED ORAL at 17:05

## 2021-07-21 ASSESSMENT — PAIN DESCRIPTION - PAIN TYPE: TYPE: ACUTE PAIN

## 2021-07-21 ASSESSMENT — PAIN DESCRIPTION - LOCATION: LOCATION: HIP

## 2021-07-21 ASSESSMENT — PAIN DESCRIPTION - ORIENTATION: ORIENTATION: RIGHT

## 2021-07-21 ASSESSMENT — PAIN SCALES - GENERAL
PAINLEVEL_OUTOF10: 6
PAINLEVEL_OUTOF10: 6

## 2021-07-21 NOTE — ED PROVIDER NOTES
[facility] EMERGENCY DEPT VISIT      Patient Identification  Brianda Garsia is a 44 y.o. female. Chief Complaint   Hip Pain, Back Pain, and Fall      History of Present Illness: This is a  44 y.o. female who presents ambulatory  to the ED with complaints of right hip pain after a fall. Patient was at work and was going down a ramp that was wet and slipped and fell. She believes she landed on her buttock. She complains of low back pain into the right hip and slightly down into the leg. It hurts more to sit than the stand. It does shoot pain down her leg when she walks. She did not hit her head nor did she lose consciousness. No neck or upper back pain. No rib pain. She has taken no medication for the pain. Past Medical History:   Diagnosis Date    Headache        Past Surgical History:   Procedure Laterality Date     SECTION      x 2    CHOLECYSTECTOMY      HYSTERECTOMY         No current facility-administered medications for this encounter.     Current Outpatient Medications:     ibuprofen (IBU) 800 MG tablet, Take 1 tablet by mouth every 8 hours as needed for Pain, Disp: 20 tablet, Rfl: 0    methocarbamol (ROBAXIN-750) 750 MG tablet, Take 1 tablet by mouth 3 times daily as needed (muscle spasms), Disp: 15 tablet, Rfl: 0    albuterol sulfate  (90 Base) MCG/ACT inhaler, Inhale 2 puffs into the lungs every 4 hours as needed for Wheezing, Disp: 18 g, Rfl: 1    fluticasone (FLONASE) 50 MCG/ACT nasal spray, 2 sprays by Nasal route daily, Disp: 1 Bottle, Rfl: 3    Naproxen Sodium (ALEVE) 220 MG CAPS, Take 3 capsules by mouth daily, Disp: , Rfl:     No Known Allergies    Social History     Socioeconomic History    Marital status:      Spouse name: Not on file    Number of children: Not on file    Years of education: Not on file    Highest education level: Not on file   Occupational History    Not on file   Tobacco Use    Smoking status: Never Smoker    Smokeless tobacco: Never Used   Substance and Sexual Activity    Alcohol use: No    Drug use: No    Sexual activity: Not Currently     Partners: Male   Other Topics Concern    Not on file   Social History Narrative    Not on file     Social Determinants of Health     Financial Resource Strain:     Difficulty of Paying Living Expenses:    Food Insecurity:     Worried About Running Out of Food in the Last Year:     [address] in the Last Year:    Transportation Needs:     Lack of Transportation (Medical):  Lack of Transportation (Non-Medical):    Physical Activity:     Days of Exercise per Week:     Minutes of Exercise per Session:    Stress:     Feeling of Stress :    Social Connections:     Frequency of Communication with Friends and Family:     Frequency of Social Gatherings with Friends and Family:     Attends [employer] Services:     Active Member of Clubs or Organizations:     Attends Club or Organization Meetings:     Marital Status:    Intimate Partner Violence:     Fear of Current or Ex-Partner:     Emotionally Abused:     Physically Abused:     Sexually Abused:        Nursing Notes Reviewed      ROS:  General: no fever  ENT: no sinus congestion, no sore throat  RESP: no cough, no shortness of breath  CARDIAC: no chest pain  GI: no abdominal pain, no vomiting, no diarrhea  : no dysuria, no hematuria, no retention, no incontinence  Musculoskeletal: + arthralgia, no myalgia, + back pain,  no joint swelling  NEURO: no headache, no numbness, no weakness, no dizziness  DERM: no rash, no erythema, no ecchymosis, no wounds      PHYSICAL EXAM:  GENERAL APPEARANCE: Chris Mendoza is in no acute respiratory distress. Awake and alert.   VITAL SIGNS:   ED Triage Vitals [07/21/21 1539]   Enc Vitals Group      /60      Pulse 62      Resp 14      Temp 97.6 °F (36.4 °C)      Temp Source Oral      SpO2 100 %      Weight 165 lb 6.4 oz (75 kg)      Height 5' 3\" (1.6 m)      Head Circumference       Peak Flow Pain Score       Pain Loc       Pain Edu? Excl. in [address]? HEAD: Normocephalic, atraumatic. EYES:  Extraocular muscles are intact. Conjunctivas are pink. Negative scleral icterus. ENT:  Mucous membranes are moist.  Pharynx without erythema or exudates. NECK: Nontender and supple. CHEST: Clear to auscultation bilaterally. No rales, rhonchi, or wheezing. HEART:  Regular rate and rhythm. No murmurs. Strong and equal pulses in the upper and lower extremities. ABDOMEN: Soft,  nondistended, positive bowel sounds. abdomen is nontender. MUSCULOSKELETAL:  Active range of motion of the upper and lower extremities. No edema. Lumbar spine mildly tender. Right buttock tender. Able to bear weight. No thoracic or cervical spine tenderness  NEUROLOGICAL: Awake, alert and oriented x 3. Power intact in the upper and lower extremities. DERMATOLOGIC: No petechiae, rashes, or ecchymoses. ED COURSE AND MEDICAL DECISION MAKING:      Radiology:  All plain films have been evaluated by myself. They may have been overread by radiologist as noted in chart. Other radiologic studies (i.e. CT, MRI, ultrasounds, etc ) have been interpreted by radiologist.     XR HIP RIGHT (2-3 VIEWS)   Final Result   1. Negative      XR LUMBAR SPINE (2-3 VIEWS)   Final Result   1. Normal lumbar spine          Labs:  No results found for this visit on 07/21/21. Treatment in the department:  Patient received   Medications   ibuprofen (ADVIL;MOTRIN) tablet 800 mg (800 mg Oral Given 7/21/21 1705)         Medical decision making:    I estimate there is LOW risk for FRACTURE, DISLOCATION, COMPARTMENT SYNDROME,, SEPTIC ARTHRITIS, OSTEOMYELITIS, TENDON OR NEUROVASCULAR INJURY, thus I consider the discharge disposition reasonable.  Yaa Saleh and I have discussed the diagnosis and risks, and we agree with discharging home to follow-up with their primary doctor or the referral orthopedist. We also discussed returning to the Emergency Department immediately if new or worsening symptoms occur. Clinical Impression:  1. Contusion of right hip, initial encounter    2. Lumbar strain, initial encounter        Dispo:  Patient will be discharged  at this time. Patient was informed of this decision and agrees with plan. I have discussed lab and xray findings with patient and they understand. Questions were answered to the best of my ability. Discharge vitals:  Blood pressure 121/60, pulse 62, temperature 97.6 °F (36.4 °C), temperature source Oral, resp. rate 14, height 5' 3\" (1.6 m), weight 165 lb 6.4 oz (75 kg), SpO2 100 %, not currently breastfeeding. Prescriptions given:   Discharge Medication List as of 7/21/2021  5:33 PM      START taking these medications    Details   ibuprofen (IBU) 800 MG tablet Take 1 tablet by mouth every 8 hours as needed for Pain, Disp-20 tablet, R-0Normal      methocarbamol (ROBAXIN-750) 750 MG tablet Take 1 tablet by mouth 3 times daily as needed (muscle spasms), Disp-15 tablet, R-0Normal               This chart was created using dragon voice recognition software.         Pauline Mock MD  07/22/21 0034

## 2021-07-22 ENCOUNTER — NURSE TRIAGE (OUTPATIENT)
Dept: OTHER | Facility: CLINIC | Age: 51
End: 2021-07-22

## 2021-07-22 NOTE — TELEPHONE ENCOUNTER
Received call from Mary Chowdhury at [facility] with Red Flag Complaint. Brief description of triage: injured right hip    Triage indicates for patient to be seen today or tomorrow    Care advice provided, patient verbalizes understanding; denies any other questions or concerns; instructed to call back for any new or worsening symptoms. Writer provided warm transfer to [place] at [facility] for appointment scheduling. Attention Provider: Thank you for allowing me to participate in the care of your patient. The patient was connected to triage in response to information provided to the [facility]. Please do not respond through this encounter as the response is not directed to a shared pool. Reason for Disposition   Injury interferes with work or school    Answer Assessment - Initial Assessment Questions  1. MECHANISM: \"How did the injury happen? \" (e.g., twisting injury, direct blow)       Fall at work    2. ONSET: \"When did the injury happen? \" (Minutes or hours ago)       Yesterday    3. LOCATION: \"Where is the injury located? \"       Right hip and lower back    4. APPEARANCE of INJURY: \"What does the injury look like? \"  (e.g., deformity of leg)      Slight bruise    5. SEVERITY: \"Can you put weight on that leg? \" \"Can you walk? \"       Yes but it hurt to sit    6. SIZE: For cuts, bruises, or swelling, ask: \"How large is it? \" (e.g., inches or centimeters;  entire joint)       Denies    7. PAIN: \"Is there pain? \" If so, ask: \"How bad is the pain? \"  (e.g., Scale 1-10; or mild, moderate, severe)      8/10    8. TETANUS: For any breaks in the skin, ask: \"When was the last tetanus booster? \"      NA    9. OTHER SYMPTOMS: \"Do you have any other symptoms? \"       Sharp pain down right leg    10. PREGNANCY: \"Is there any chance you are pregnant? \" \"When was your last menstrual period? \"        NA    Protocols used: HIP INJURY-ADULT-[place]

## 2021-07-23 ENCOUNTER — OFFICE VISIT (OUTPATIENT)
Dept: FAMILY MEDICINE CLINIC | Age: 51
End: 2021-07-23

## 2021-07-23 VITALS
HEIGHT: 63 IN | DIASTOLIC BLOOD PRESSURE: 80 MMHG | HEART RATE: 53 BPM | SYSTOLIC BLOOD PRESSURE: 118 MMHG | WEIGHT: 165.2 LBS | BODY MASS INDEX: 29.27 KG/M2 | OXYGEN SATURATION: 98 %

## 2021-07-23 DIAGNOSIS — M25.551 RIGHT HIP PAIN: Primary | ICD-10-CM

## 2021-07-23 DIAGNOSIS — S39.012D STRAIN OF LUMBAR REGION, SUBSEQUENT ENCOUNTER: ICD-10-CM

## 2021-07-23 PROCEDURE — 99213 OFFICE O/P EST LOW 20 MIN: CPT | Performed by: NURSE PRACTITIONER

## 2021-07-23 SDOH — ECONOMIC STABILITY: FOOD INSECURITY: WITHIN THE PAST 12 MONTHS, YOU WORRIED THAT YOUR FOOD WOULD RUN OUT BEFORE YOU GOT MONEY TO BUY MORE.: NEVER TRUE

## 2021-07-23 SDOH — ECONOMIC STABILITY: FOOD INSECURITY: WITHIN THE PAST 12 MONTHS, THE FOOD YOU BOUGHT JUST DIDN'T LAST AND YOU DIDN'T HAVE MONEY TO GET MORE.: NEVER TRUE

## 2021-07-23 ASSESSMENT — SOCIAL DETERMINANTS OF HEALTH (SDOH): HOW HARD IS IT FOR YOU TO PAY FOR THE VERY BASICS LIKE FOOD, HOUSING, MEDICAL CARE, AND HEATING?: NOT HARD AT ALL

## 2021-07-23 NOTE — LETTER
[facility]  [address] [place] [address],[address]  Phone: [phone]  Fax: [phone]    AIDE Carvajal CNP        July 23, 2021     Patient: Nina Andrade   YOB: 1970   Date of Visit: 7/23/2021       To Whom it May Concern:    Nina Andrade was seen in my clinic on 7/23/2021. She may return to work on 7/28/2021. If you have any questions or concerns, please don't hesitate to call.     Sincerely,           AIDE Carvajal CNP

## 2021-07-23 NOTE — PATIENT INSTRUCTIONS
Patient Education        Low Back Pain: Exercises  Introduction  Here are some examples of exercises for you to try. The exercises may be suggested for a condition or for rehabilitation. Start each exercise slowly. Ease off the exercises if you start to have pain. You will be told when to start these exercises and which ones will work best for you. How to do the exercises  Press-up   1. Lie on your stomach, supporting your body with your forearms. 2. Press your elbows down into the floor to raise your upper back. As you do this, relax your stomach muscles and allow your back to arch without using your back muscles. As your press up, do not let your hips or pelvis come off the floor. 3. Hold for 15 to 30 seconds, then relax. 4. Repeat 2 to 4 times. Alternate arm and leg (bird dog) exercise   Do this exercise slowly. Try to keep your body straight at all times, and do not let one hip drop lower than the other. 1. Start on the floor, on your hands and knees. 2. Tighten your belly muscles. 3. Raise one leg off the floor, and hold it straight out behind you. Be careful not to let your hip drop down, because that will twist your trunk. 4. Hold for about 6 seconds, then lower your leg and switch to the other leg. 5. Repeat 8 to 12 times on each leg. 6. Over time, work up to holding for 10 to 30 seconds each time. 7. If you feel stable and secure with your leg raised, try raising the opposite arm straight out in front of you at the same time. Knee-to-chest exercise   1. Lie on your back with your knees bent and your feet flat on the floor. 2. Bring one knee to your chest, keeping the other foot flat on the floor (or keeping the other leg straight, whichever feels better on your lower back). 3. Keep your lower back pressed to the floor. Hold for at least 15 to 30 seconds. 4. Relax, and lower the knee to the starting position. 5. Repeat with the other leg. Repeat 2 to 4 times with each leg.   6. To get more stretch, put your other leg flat on the floor while pulling your knee to your chest.    Curl-ups   1. Lie on the floor on your back with your knees bent at a 90-degree angle. Your feet should be flat on the floor, about 12 inches from your buttocks. 2. Cross your arms over your chest. If this bothers your neck, try putting your hands behind your neck (not your head), with your elbows spread apart. 3. Slowly tighten your belly muscles and raise your shoulder blades off the floor. 4. Keep your head in line with your body, and do not press your chin to your chest.  5. Hold this position for 1 or 2 seconds, then slowly lower yourself back down to the floor. 6. Repeat 8 to 12 times. Pelvic tilt exercise   1. Lie on your back with your knees bent. 2. \"Brace\" your stomach. This means to tighten your muscles by pulling in and imagining your belly button moving toward your spine. You should feel like your back is pressing to the floor and your hips and pelvis are rocking back. 3. Hold for about 6 seconds while you breathe smoothly. 4. Repeat 8 to 12 times. Heel dig bridging   1. Lie on your back with both knees bent and your ankles bent so that only your heels are digging into the floor. Your knees should be bent about 90 degrees. 2. Then push your heels into the floor, squeeze your buttocks, and lift your hips off the floor until your shoulders, hips, and knees are all in a straight line. 3. Hold for about 6 seconds as you continue to breathe normally, and then slowly lower your hips back down to the floor and rest for up to 10 seconds. 4. Do 8 to 12 repetitions. Hamstring stretch in doorway   1. Lie on your back in a doorway, with one leg through the open door. 2. Slide your leg up the wall to straighten your knee. You should feel a gentle stretch down the back of your leg. 3. Hold the stretch for at least 15 to 30 seconds. Do not arch your back, point your toes, or bend either knee.  Keep one heel touching the floor and the other heel touching the wall. 4. Repeat with your other leg. 5. Do 2 to 4 times for each leg. Hip flexor stretch   1. Kneel on the floor with one knee bent and one leg behind you. Place your forward knee over your foot. Keep your other knee touching the floor. 2. Slowly push your hips forward until you feel a stretch in the upper thigh of your rear leg. 3. Hold the stretch for at least 15 to 30 seconds. Repeat with your other leg. 4. Do 2 to 4 times on each side. Wall sit   1. Stand with your back 10 to 12 inches away from a wall. 2. Lean into the wall until your back is flat against it. 3. Slowly slide down until your knees are slightly bent, pressing your lower back into the wall. 4. Hold for about 6 seconds, then slide back up the wall. 5. Repeat 8 to 12 times. Follow-up care is a key part of your treatment and safety. Be sure to make and go to all appointments, and call your doctor if you are having problems. It's also a good idea to know your test results and keep a list of the medicines you take. Where can you learn more? Go to https://CT Atlantic.Dada. org and sign in to your [employer] account. Enter D184 in the [facility] box to learn more about \"Low Back Pain: Exercises. \"     If you do not have an account, please click on the \"Sign Up Now\" link. Current as of: November 16, 2020               Content Version: 12.9  © 2006-2021 Healthwise, Incorporated. Care instructions adapted under license by [facility] ([facility]). If you have questions about a medical condition or this instruction, always ask your healthcare professional. [place] [address] any warranty or liability for your use of this information. Patient Education        Hip Flexor Strain: Rehab Exercises  Introduction  Here are some examples of exercises for you to try. The exercises may be suggested for a condition or for rehabilitation.  Start each exercise slowly. Ease off the exercises if you start to have pain. You will be told when to start these exercises and which ones will work best for you. How to do the exercises  Pelvic tilt with marching   5. Lie on your back with your knees bent and your feet flat on the floor. 6. Tighten your belly muscles and buttocks, and press your lower back to the floor. 7. Keeping your knees bent, lift and then lower one leg up off the floor, and then lift and lower your other leg like you are marching. Each time you lift your leg, hold that position for about 6 seconds before lowering your leg. 8. Repeat 8 to 12 times. Scissors   8. Lie on your back with your knees bent at a 90-degree angle and your feet off the floor. 9. Tighten your belly muscles and buttocks, and press your lower back to the floor. 10. Slowly straighten one leg, and hold that position for about 6 seconds. Your leg should be about 12 inches off the floor. Bring that leg back to the starting position, and then straighten your other leg. Hold that position for about 6 seconds, and then switch legs again. 11. Repeat 8 to 12 times. Hamstring stretch (lying down)   7. Lie flat on your back with your legs straight. If you feel discomfort in your back, place a small towel roll under your lower back. 8. Holding the back of your affected leg for support, lift your leg straight up and toward your body until you feel a stretch at the back of your thigh. 9. Hold the stretch for at least 30 seconds. 10. Repeat 2 to 4 times. Quadricep and hip flexor stretch (lying on side)   7. Lie on your side with your good leg flat on the floor and your hand supporting your head. 8. Bend your top leg, and reach behind you to grab the front of that foot or ankle with your other hand. 9. Stretch your leg back by pulling your foot toward your buttock. You will feel the stretch in the front of your thigh. If this causes stress on your knee, do not do this stretch.   10. Hold the stretch for at least 15 to 30 seconds. 11. Repeat 2 to 4 times. Hip flexor stretch (kneeling)   5. Kneel on your affected leg and bend your good leg out in front of you, with that foot flat on the floor. If you feel discomfort in the front of your knee, place a towel under your knee. 6. Keeping your back straight, slowly push your hips forward until you feel a stretch in the upper thigh of your back leg and hip. 7. Hold the stretch for at least 15 to 30 seconds. 8. Repeat 2 to 4 times. Hip flexor stretch (edge of table)   5. Lie flat on your back on a table or flat bench, with your knees and lower legs hanging off the edge of the table. 6. Grab your good leg at the knee, and pull that knee back toward your chest. Relax your affected leg and let it hang down toward the floor until you feel a stretch in the upper thigh of your affected leg and hip. 7. Hold the stretch for at least 15 to 30 seconds. 8. Repeat 2 to 4 times. Follow-up care is a key part of your treatment and safety. Be sure to make and go to all appointments, and call your doctor if you are having problems. It's also a good idea to know your test results and keep a list of the medicines you take. Where can you learn more? Go to https://Mfuse.Jounce. org and sign in to your [facility] account. Enter Y928 in the [facility] box to learn more about \"Hip Flexor Strain: Rehab Exercises. \"     If you do not have an account, please click on the \"Sign Up Now\" link. Current as of: November 16, 2020               Content Version: 12.9  © 1174-2765 Healthwise, Incorporated. Care instructions adapted under license by [facility] ([facility]). If you have questions about a medical condition or this instruction, always ask your healthcare professional. [address] any warranty or liability for your use of this information.

## 2021-07-23 NOTE — PROGRESS NOTES
Pedro Isabel (:  1970) is a 44 y.o. female,Established patient, here for evaluation of the following chief complaint(s):  Hip Pain (Went to the ER  [facility] )         ASSESSMENT/PLAN:  1. Right hip pain  Stable;  Continue current regimen. Exercises given. 2. Strain of lumbar region, subsequent encounter  Stable;  Continue current regimen. Exercises given. Return if symptoms worsen or fail to improve. Subjective   SUBJECTIVE/OBJECTIVE:  HPI  Slipped down a wet ramp  Fell upwards and then downwards  Amrit on right buttocks and right hip; afterwards had right lower back pain  No numbness or tingling today- did have sharp pain down leg- less often than when it started  No swelling  No weakness  Has a slight bruise  Walking is ok; walks slow  Standing is worse  Sitting- cannot sit on it; cushion helps  Muscle relaxer- help, but make her tired  Ibuprofen- helps  Ice- helps    Review of Systems       Objective   Physical Exam  Vitals reviewed. Constitutional:       Appearance: Normal appearance. HENT:      Head: Normocephalic. Right Ear: External ear normal.      Left Ear: External ear normal.   Cardiovascular:      Rate and Rhythm: Normal rate and regular rhythm. Pulses: Normal pulses. Heart sounds: Normal heart sounds, S1 normal and S2 normal.   Pulmonary:      Effort: Pulmonary effort is normal.      Breath sounds: Normal breath sounds and air entry. Musculoskeletal:      Lumbar back: Tenderness present. No deformity. Normal range of motion. Negative right straight leg raise test and negative left straight leg raise test.      Right hip: Tenderness present. Normal range of motion. Normal strength. Right lower leg: No edema. Left lower leg: No edema. Neurological:      Mental Status: She is alert. Psychiatric:         Mood and Affect: Mood normal.            An electronic signature was used to authenticate this note.     --Aldo Miller Vicente, APRN - CNP

## 2021-07-27 ENCOUNTER — TELEPHONE (OUTPATIENT)
Dept: FAMILY MEDICINE CLINIC | Age: 51
End: 2021-07-27

## 2021-07-27 DIAGNOSIS — R63.4 WEIGHT LOSS: Primary | ICD-10-CM

## 2021-07-27 NOTE — TELEPHONE ENCOUNTER
Sure- does she have someone in mind? We usually refer for Dr. Robinson Hobbs, but it might be considered cosmetic?

## 2021-07-27 NOTE — TELEPHONE ENCOUNTER
----- Message from Aracelisilda Baer sent at 7/27/2021 10:26 AM EDT -----  Subject: Referral Request    QUESTIONS   Reason for referral request? Pt would like a referral to have extra skin on abdomen removed. Has the physician seen you for this condition before? No   Preferred Specialist (if applicable)? Do you already have an appointment scheduled? No  Additional Information for Provider?   ---------------------------------------------------------------------------  --------------  CALL BACK INFO  What is the best way for the office to contact you? OK to leave message on   voicemail  Preferred Call Back Phone Number?  [phone]

## 2021-07-27 NOTE — TELEPHONE ENCOUNTER
Patient informed.  Referral created/provided with scheduling information per patient requested    PAMELA BOJORQUEZ [facility], Dr. Cruzito Yoo MD  [facility] , [address] [place] [address]  [phone]

## 2021-09-29 ENCOUNTER — OFFICE VISIT (OUTPATIENT)
Dept: FAMILY MEDICINE CLINIC | Age: 51
End: 2021-09-29
Payer: COMMERCIAL

## 2021-09-29 VITALS
SYSTOLIC BLOOD PRESSURE: 124 MMHG | DIASTOLIC BLOOD PRESSURE: 80 MMHG | HEART RATE: 60 BPM | BODY MASS INDEX: 29.41 KG/M2 | OXYGEN SATURATION: 100 % | HEIGHT: 63 IN | WEIGHT: 166 LBS

## 2021-09-29 DIAGNOSIS — G43.009 MIGRAINE WITHOUT AURA AND WITHOUT STATUS MIGRAINOSUS, NOT INTRACTABLE: ICD-10-CM

## 2021-09-29 DIAGNOSIS — L30.9 ECZEMA, UNSPECIFIED TYPE: Primary | ICD-10-CM

## 2021-09-29 DIAGNOSIS — Z12.11 COLON CANCER SCREENING: ICD-10-CM

## 2021-09-29 PROCEDURE — 99214 OFFICE O/P EST MOD 30 MIN: CPT | Performed by: FAMILY MEDICINE

## 2021-09-29 RX ORDER — TRIAMCINOLONE ACETONIDE 1 MG/G
CREAM TOPICAL
Qty: 80 G | Refills: 1 | Status: SHIPPED | OUTPATIENT
Start: 2021-09-29

## 2021-09-29 NOTE — PROGRESS NOTES
Alexus Ceron (:  1970) is a 46 y.o. female,Established patient, here for evaluation of the following chief complaint(s):  Rash (Bilateral arm rash that has spread to her left leg x 1 week.) and Forms (FMLA)         ASSESSMENT/PLAN:  Amarilis was seen today for rash and forms. Diagnoses and all orders for this visit:    Eczema, unspecified type  Not well controlled   Topical steroid  Colon cancer screening  -     Cologuard; Future    Migraine without aura and without status migrainosus, not intractable  Stable. fmla filled out  Other orders  -     triamcinolone (KENALOG) 0.1 % cream; Apply topically 2 times daily. No follow-ups on file. Subjective   SUBJECTIVE/OBJECTIVE:  HPI   Pt is a of 46 y.o. female comes in today with   Chief Complaint   Patient presents with    Rash     Bilateral arm rash that has spread to her left leg x 1 week.  Forms     FMLA     Needs fmla filled out for migraines. Same as last year. Very itchy. Vitals:    21 1450   BP: 124/80   Site: Left Upper Arm   Position: Sitting   Cuff Size: Medium Adult   Pulse: 60   SpO2: 100%   Weight: 166 lb (75.3 kg)   Height: 5' 3\" (1.6 m)        Review of Systems       Objective   Physical Exam         An electronic signature was used to authenticate this note.     --Michael Galo MD

## 2021-10-01 ENCOUNTER — TELEPHONE (OUTPATIENT)
Dept: FAMILY MEDICINE CLINIC | Age: 51
End: 2021-10-01

## 2021-10-01 NOTE — TELEPHONE ENCOUNTER
Pt came in to drop off FMLA forms. Pt also would like to know if she can get a work excuse wrote from her apt on 9- to Monday 10-.      Please send work excuse to [employer] for pt     Please advise

## 2021-10-01 NOTE — LETTER
[facility]  [address] [place] [address],[address]  Phone: [phone]  Fax: [phone]    Wilmer Botello MD        October 1, 2021     Patient: Ming Spence   YOB: 1970   Date of Visit: 09/29/2021       To Whom it May Concern:    Ming Spence was seen in my clinic on 09/29/2021. Please excuse her absence from 09/29/2021 through 10/04/2021. She may return to work on 10/04/2021. If you have any questions or concerns, please don't hesitate to call.     Sincerely,         Wilmer Botello MD

## 2021-10-11 ENCOUNTER — OFFICE VISIT (OUTPATIENT)
Dept: SURGERY | Age: 51
End: 2021-10-11
Payer: COMMERCIAL

## 2021-10-11 VITALS
BODY MASS INDEX: 30.12 KG/M2 | TEMPERATURE: 98.1 F | HEIGHT: 63 IN | WEIGHT: 170 LBS | SYSTOLIC BLOOD PRESSURE: 121 MMHG | DIASTOLIC BLOOD PRESSURE: 69 MMHG | HEART RATE: 57 BPM

## 2021-10-11 DIAGNOSIS — L90.5 SCAR PAIN: Primary | ICD-10-CM

## 2021-10-11 DIAGNOSIS — R10.30 LOWER ABDOMINAL PAIN: ICD-10-CM

## 2021-10-11 PROCEDURE — 99204 OFFICE O/P NEW MOD 45 MIN: CPT | Performed by: SURGERY

## 2021-10-11 NOTE — PROGRESS NOTES
[facility]    CC:  scarring    Referring Physician: Marta Blancas (PCP)    HPI: This is an 46 y. o.female with a PMHx as delineated below who presents to clinic in consultation for pain from a csection. She underwent a Cesarian  noting that she has had pain for the past 10 years. She notes that it has worsened over time that has worsened significantly after she has lost weight. She has lost 50 lbs with diet and exercise. Given the persistent pain, she was referred to plastic surgery for evaluation. She denies any previous keloids. PMHx:   Past Medical History:   Diagnosis Date    Headache      PSHx:   Past Surgical History:   Procedure Laterality Date     SECTION      x 2    CHOLECYSTECTOMY      HYSTERECTOMY       Allergy: No Known Allergies    SHx:   Social History     Socioeconomic History    Marital status:      Spouse name: Not on file    Number of children: Not on file    Years of education: Not on file    Highest education level: Not on file   Occupational History    Not on file   Tobacco Use    Smoking status: Never Smoker    Smokeless tobacco: Never Used   Substance and Sexual Activity    Alcohol use: No    Drug use: No    Sexual activity: Not Currently     Partners: Male   Other Topics Concern    Not on file   Social History Narrative    Not on file     Social Determinants of Health     Financial Resource Strain: Low Risk     Difficulty of Paying Living Expenses: Not hard at all   Food Insecurity: No Food Insecurity    Worried About [address] in the Last Year: Never true    [address]  in the Last Year: Never true   Transportation Needs:     Lack of Transportation (Medical):      Lack of Transportation (Non-Medical):    Physical Activity:     Days of Exercise per Week:     Minutes of Exercise per Session:    Stress:     Feeling of Stress :    Social Connections:     Frequency of Communication with Friends and Family:     Frequency of Social Gatherings with Friends and Family:     Attends Bahai Services:     Active Member of Clubs or Organizations:     Attends Club or Organization Meetings:     Marital Status:    Intimate Partner Violence:     Fear of Current or Ex-Partner:     Emotionally Abused:     Physically Abused:     Sexually Abused:      FHx: Family history of keloid: No  Meds:   Current Outpatient Medications   Medication Sig Dispense Refill    triamcinolone (KENALOG) 0.1 % cream Apply topically 2 times daily. 80 g 1    ibuprofen (IBU) 800 MG tablet Take 1 tablet by mouth every 8 hours as needed for Pain 20 tablet 0    fluticasone (FLONASE) 50 MCG/ACT nasal spray 2 sprays by Nasal route daily 1 Bottle 3    Naproxen Sodium (ALEVE) 220 MG CAPS Take 3 capsules by mouth daily       No current facility-administered medications for this visit. ROS   Constitutional: Negative for chills and fever. HENT: Negative for congestion, facial swelling, and voice change. Eyes: Negative for photophobia and visual disturbance. Respiratory: Negative for apnea, cough, chest tightness and shortness of breath. Cardiovascular: Negative for chest pain and palpitations. Gastrointestinal: Negative for dysphagia and early satiety. Genitourinary: Negative for difficulty urinating, dysuria, flank pain, frequency and hematuria. Musculoskeletal: Negative for new gait problem, joint swelling and myalgias. Skin: Negative for color change, pallor and rash. Endocrine: negative for tremors, temperature intolerance or polydipsia. Allergic/Immunologic: Negative for new environmental or food allergies. Neurological: Negative for dizziness, seizures, speech difficulty, numbness. Hematological: Negative for adenopathy. Psychiatric/Behavioral: Negative for agitation and confusion.     EXAM    /69   Pulse 57   Temp 98.1 °F (36.7 °C)   Ht 5' 3\" (1.6 m)   Wt 170 lb (77.1 kg)   BMI 30.11 kg/m² GEN: NAD, pleasant, obese  CVS: RRR  PULM: No respiratory distress  HEENT: PERRLA/EOMI; hearing appears within normal limits  NECK: Supple with trachea in midline, no masses  ABD: soft/NT/obese  Celiotomy incision with firmness and palpable discomfort    IMP: 46 y. o.female with painful scar after longstanding Csection. PLAN: Will obtain CT imaging to ensure no evidence of hernia causing discomfort. Would then benefit from scar revision as well as potential abdominoplasty with rectus plication and umbilical transposition. Will await imaging and then provide pricing for cosmetic aspects. Will then schedule. A discussion regarding surgical options including: excision, abdominoplasty was performed with the patient. The pathophysiology of scarring was also elucidated specifying need for resection, observation, & margins. Clinical photos were obtained. Additionally, discussion regarding the risks including, but not limited to: bleeding (potentially requiring transfusion or reoperation), infection, seroma, reoperation, poor cosmetic outcome, scarring, recurrence, umbilical loss, revisional surgery, diminished sensation, VTE (DVT/PE), and death was performed. All questions were answered in a satisfactory manner. The patient was counseled at length about the risks of pricila Covid-19 during their perioperative period and any recovery window from their procedure. The patient was made aware that pricila Covid-19  may worsen their prognosis for recovering from their procedure  and lend to a higher morbidity and/or mortality risk. All material risks, benefits, and reasonable alternatives including postponing the procedure were discussed. The patient does wish to proceed with the procedure at this time.     Brigido Galo MD  [address] [place] [address] Reconstructive Surgery  [phone]  10/11/21

## 2021-10-11 NOTE — Clinical Note
Thanks for the referral!    Maynor Nino is a josé miguel lady who would benefit from CT imaging to ensure no evidence of hernia causing her pain. If negative, would then offer revision of scar with abdominoplasty for improved contour. If you have any questions or concerns, please do not hesitate to contact me anytime at your earliest convenience either by Epic or phone ([phone]). Thanks!   Kenny Moran

## 2021-10-15 ENCOUNTER — HOSPITAL ENCOUNTER (OUTPATIENT)
Dept: CT IMAGING | Age: 51
Discharge: HOME | End: 2021-10-15
Payer: COMMERCIAL

## 2021-10-15 DIAGNOSIS — R10.30 LOWER ABDOMINAL PAIN: ICD-10-CM

## 2021-10-15 PROCEDURE — 6360000004 HC RX CONTRAST MEDICATION: Performed by: SURGERY

## 2021-10-15 PROCEDURE — 74177 CT ABD & PELVIS W/CONTRAST: CPT

## 2021-10-15 RX ADMIN — IOPAMIDOL 75 ML: 755 INJECTION, SOLUTION INTRAVENOUS at 11:02

## 2021-10-15 RX ADMIN — IOHEXOL 50 ML: 240 INJECTION, SOLUTION INTRATHECAL; INTRAVASCULAR; INTRAVENOUS; ORAL at 11:02

## 2021-10-19 ENCOUNTER — TELEPHONE (OUTPATIENT)
Dept: SURGERY | Age: 51
End: 2021-10-19

## 2021-10-19 NOTE — TELEPHONE ENCOUNTER
Patient seen in office on 10/11/21 with below plan:   IMP: 46 y. o.female with painful scar after longstanding Csection. PLAN: Will obtain CT imaging to ensure no evidence of hernia causing discomfort. Would then benefit from scar revision as well as potential abdominoplasty with rectus plication and umbilical transposition. Will await imaging and then provide pricing for cosmetic aspects. Will then schedule. Called patent to discuss results. Patient is interested in pricing. Request a surgery letter from MD for codes, due to determination for insurance coverage of scar revision.

## 2021-10-19 NOTE — TELEPHONE ENCOUNTER
----- Message from Yancy Mandujano MD sent at 10/15/2021  7:15 PM EDT -----  Imaging did not reveal acute pathology, but did reveal rectus diastasis. [id number] [address] for scar revision with abdominoplasty if patient is interested. Thanks! NK

## 2021-10-19 NOTE — TELEPHONE ENCOUNTER
I received a surgery letter from Dr. Marylu Winn today. I spoke Jordana Salcedo with at [facility] ([phone]) to see if CPT Code 08279 requires pre certification. Pre authorization and notification are not required for an outpatient surgery. Call Reference # [id number]    I spoke with the patient at the home number listed to discuss insurance and surgery scheduling. The patient was provided with cosmetic pricing and procedures. The patient stated that she does want to move forward, but needs some time to think about it. She will reach out to me when she is ready to move forward. I will leave this phone note open.

## 2021-11-04 ENCOUNTER — TELEPHONE (OUTPATIENT)
Dept: FAMILY MEDICINE CLINIC | Age: 51
End: 2021-11-04

## 2021-11-04 DIAGNOSIS — Z12.11 COLON CANCER SCREENING: ICD-10-CM

## 2021-11-19 NOTE — TELEPHONE ENCOUNTER
I spoke with the patient at the home number listed to discuss insurance and surgery scheduling. The patient stated that she is still trying to decide and right now she is stuck in the middle. I advised that I will leave this phone note open and will try to follow up with her again in about 1 month.      I will leave this phone note open.

## 2021-12-21 NOTE — TELEPHONE ENCOUNTER
I lmom for the patient at the home number listed.  I requested a call back to discuss how or if she would like to move forward.       I will leave this phone note open.

## 2021-12-29 ENCOUNTER — VIRTUAL VISIT (OUTPATIENT)
Dept: FAMILY MEDICINE CLINIC | Age: 51
End: 2021-12-29
Payer: COMMERCIAL

## 2021-12-29 DIAGNOSIS — R52 BODY ACHES: ICD-10-CM

## 2021-12-29 DIAGNOSIS — R06.02 SOB (SHORTNESS OF BREATH): ICD-10-CM

## 2021-12-29 DIAGNOSIS — J02.9 SORE THROAT: ICD-10-CM

## 2021-12-29 DIAGNOSIS — R09.81 SINUS CONGESTION: ICD-10-CM

## 2021-12-29 DIAGNOSIS — R05.9 COUGH: Primary | ICD-10-CM

## 2021-12-29 DIAGNOSIS — R19.7 DIARRHEA, UNSPECIFIED TYPE: ICD-10-CM

## 2021-12-29 PROCEDURE — 99213 OFFICE O/P EST LOW 20 MIN: CPT | Performed by: NURSE PRACTITIONER

## 2021-12-29 PROCEDURE — 87635 SARS-COV-2 COVID-19 AMP PRB: CPT | Performed by: NURSE PRACTITIONER

## 2021-12-29 PROCEDURE — 87804 INFLUENZA ASSAY W/OPTIC: CPT | Performed by: NURSE PRACTITIONER

## 2021-12-29 PROCEDURE — 87880 STREP A ASSAY W/OPTIC: CPT | Performed by: NURSE PRACTITIONER

## 2021-12-29 RX ORDER — ALBUTEROL SULFATE 90 UG/1
2 AEROSOL, METERED RESPIRATORY (INHALATION) EVERY 6 HOURS PRN
Qty: 18 G | Refills: 3 | Status: SHIPPED | OUTPATIENT
Start: 2021-12-29

## 2021-12-29 ASSESSMENT — ENCOUNTER SYMPTOMS
STRIDOR: 0
TROUBLE SWALLOWING: 0
DIARRHEA: 0
SHORTNESS OF BREATH: 0
VOMITING: 0
RHINORRHEA: 0
COUGH: 0
SINUS PAIN: 0
COLOR CHANGE: 0
BLOOD IN STOOL: 0
BACK PAIN: 0
EYE DISCHARGE: 0
CONSTIPATION: 0
ABDOMINAL PAIN: 0
NAUSEA: 0
EYE REDNESS: 0
CHOKING: 0
VOICE CHANGE: 0
CHEST TIGHTNESS: 0
SINUS PRESSURE: 0
PHOTOPHOBIA: 0
EYE ITCHING: 0
EYE PAIN: 0
WHEEZING: 0
SORE THROAT: 0

## 2021-12-29 NOTE — PROGRESS NOTES
2021    TELEHEALTH EVALUATION -- Audio/Visual (During UGYBL-29 public health emergency)    HPI:    Batsheva Chino (:  1970) has requested an audio/video evaluation for the following concern(s):    HPI    COVID symptoms: Patient complains of cough for 1 week, congestion, headache, diarrhea, sore throat, body aches and sweats. She just want to lay around. Started last week but getting wore. Started getting worse the past couple days. Sore throat, tight chest, body aches, coughing up mucus, headache. She works for [employer] and massive covid outbreak. Got significantly worse Monday. She had diarrhea yesterday. Ok to give Note for work. Off since last Monday. Will wait to get test results in order to write. Review of Systems   Constitutional: Positive for activity change. Negative for appetite change, chills, diaphoresis, fatigue, fever and unexpected weight change. HENT: Negative for congestion, ear discharge, ear pain, hearing loss, nosebleeds, postnasal drip, rhinorrhea, sinus pressure, sinus pain, sneezing, sore throat, tinnitus, trouble swallowing and voice change. Eyes: Negative for photophobia, pain, discharge, redness and itching. Respiratory: Negative for cough, choking, chest tightness, shortness of breath, wheezing and stridor. Cardiovascular: Negative for chest pain, palpitations and leg swelling. Gastrointestinal: Negative for abdominal pain, blood in stool, constipation, diarrhea, nausea and vomiting. Endocrine: Negative for cold intolerance, heat intolerance, polydipsia and polyuria. Genitourinary: Negative for difficulty urinating, dysuria, enuresis, flank pain, frequency, hematuria and urgency. Musculoskeletal: Negative for back pain, gait problem, joint swelling, neck pain and neck stiffness. Skin: Negative for color change, pallor, rash and wound. Allergic/Immunologic: Negative for environmental allergies and food allergies.    Neurological: Negative for dizziness, tremors, syncope, speech difficulty, weakness, light-headedness, numbness and headaches. Hematological: Negative for adenopathy. Does not bruise/bleed easily. Psychiatric/Behavioral: Negative for agitation, behavioral problems, confusion, decreased concentration, dysphoric mood, hallucinations, self-injury, sleep disturbance and suicidal ideas. The patient is not nervous/anxious and is not hyperactive. Prior to Visit Medications    Medication Sig Taking? Authorizing Provider   albuterol sulfate HFA (PROAIR HFA) 108 (90 Base) MCG/ACT inhaler Inhale 2 puffs into the lungs every 6 hours as needed for Wheezing Yes AIDE Acosta CNP   triamcinolone (KENALOG) 0.1 % cream Apply topically 2 times daily. Patient not taking: Reported on 12/29/2021  Ever Mendenhall MD   ibuprofen (IBU) 800 MG tablet Take 1 tablet by mouth every 8 hours as needed for Pain  Alise Rodriguez MD   fluticasone (FLONASE) 50 MCG/ACT nasal spray 2 sprays by Nasal route daily  Patient not taking: Reported on 12/29/2021  Ever Mendenhall MD   Naproxen Sodium (ALEVE) 220 MG CAPS Take 3 capsules by mouth daily  Patient not taking: Reported on 12/29/2021  Historical Provider, MD       Social History     Tobacco Use    Smoking status: Never Smoker    Smokeless tobacco: Never Used   Substance Use Topics    Alcohol use: No    Drug use:  No            PHYSICAL EXAMINATION:  [ INSTRUCTIONS:  \"[x]\" Indicates a positive item  \"[]\" Indicates a negative item  -- DELETE ALL ITEMS NOT EXAMINED]  Vital Signs: (As obtained by patient/caregiver or practitioner observation)       Constitutional: [] Appears well-developed and well-nourished [] No apparent distress      [x] Abnormal-laying in bed and appears very fatigued   mental status  [x] Alert and awake  [x] Oriented to person/place/time [x]Able to follow commands      Eyes:  EOM    [x]  Normal  [] Abnormal-  Sclera  [x]  Normal  [] Abnormal -         Discharge [x]  None visible [] Abnormal -    HENT:   [x] Normocephalic, atraumatic. [] Abnormal   [x] Mouth/Throat: Mucous membranes are moist.     External Ears [x] Normal  [] Abnormal-     Neck: [x] No visualized mass     Pulmonary/Chest: [] Respiratory effort normal.  [] No visualized signs of difficulty breathing or respiratory distress        [x] Abnormal-strong productive cough     Musculoskeletal:   [x] Normal gait with no signs of ataxia         [x] Normal range of motion of neck        [] Abnormal-       Neurological:        [x] No Facial Asymmetry (Cranial nerve 7 motor function) (limited exam to video visit)          [x] No gaze palsy        [] Abnormal-         Skin:        [x] No significant exanthematous lesions or discoloration noted on facial skin         [] Abnormal-            Psychiatric:       [x] Normal Affect [] No Hallucinations        [] Abnormal-     Other pertinent observable physical exam findings-     She looks like she does not feel well at all and is having bad body aches. Patient able to speak in full sentences and does not appear to have shortness of breath but does appear that a \"bus hit her\". Differential diagnosis includes flu, Covid, strep. Denies any fever at this time but advised to take Tylenol and/or ibuprofen as needed every 6 for fever and chills as well as body aches. Stay hydrated and rest until coming in tomorrow      Due to this being a TeleHealth encounter, evaluation of the following organ systems is limited: Vitals/Constitutional/EENT/Resp/CV/GI//MS/Neuro/Skin/Heme-Lymph-Imm. ASSESSMENT/PLAN:  1. Cough    - POCT COVID-19, Rapid; Future  - POCT Influenza A/B; Future  - albuterol sulfate HFA (PROAIR HFA) 108 (90 Base) MCG/ACT inhaler; Inhale 2 puffs into the lungs every 6 hours as needed for Wheezing  Dispense: 18 g; Refill: 3    2. Body aches    - POCT COVID-19, Rapid; Future  - POCT Influenza A/B; Future  - albuterol sulfate HFA (PROAIR HFA) 108 (90 Base) MCG/ACT inhaler;  Inhale 2 puffs into the lungs every 6 hours as needed for Wheezing  Dispense: 18 g; Refill: 3    3. Sinus congestion    - POCT COVID-19, Rapid; Future  - POCT Influenza A/B; Future  - albuterol sulfate HFA (PROAIR HFA) 108 (90 Base) MCG/ACT inhaler; Inhale 2 puffs into the lungs every 6 hours as needed for Wheezing  Dispense: 18 g; Refill: 3    4. Diarrhea, unspecified type    - POCT COVID-19, Rapid; Future  - POCT Influenza A/B; Future  - albuterol sulfate HFA (PROAIR HFA) 108 (90 Base) MCG/ACT inhaler; Inhale 2 puffs into the lungs every 6 hours as needed for Wheezing  Dispense: 18 g; Refill: 3    5. Sore throat    - POCT COVID-19, Rapid; Future  - POCT Influenza A/B; Future  - POCT rapid strep A; Future  - albuterol sulfate HFA (PROAIR HFA) 108 (90 Base) MCG/ACT inhaler; Inhale 2 puffs into the lungs every 6 hours as needed for Wheezing  Dispense: 18 g; Refill: 3    6. SOB (shortness of breath)    - albuterol sulfate HFA (PROAIR HFA) 108 (90 Base) MCG/ACT inhaler; Inhale 2 puffs into the lungs every 6 hours as needed for Wheezing  Dispense: 18 g; Refill: 3    She will come tomorrow at 10 AM to get tested. She states she is unable to come this afternoon before 330. Pending test results will order appropriate treatment. No follow-ups on file. An  electronic signature was used to authenticate this note. --AIDE La - CNP on 12/29/2021 at 2:38 PM        Pursuant to the emergency declaration under the [facility] waiver authority and the [employer] Act, this Virtual  Visit was conducted, with patient's consent, to reduce the patient's risk of exposure to COVID-19 and provide continuity of care for an established patient. Services were provided through a video synchronous discussion virtually to substitute for in-person clinic visit.     This document was prepared by a combination of typing and transcription through a voice recognition software.

## 2021-12-30 LAB
INFLUENZA A ANTIBODY: NEGATIVE
INFLUENZA B ANTIBODY: NEGATIVE
Lab: NORMAL
QC PASS/FAIL: NORMAL
S PYO AG THROAT QL: NORMAL
SARS-COV-2 RDRP RESP QL NAA+PROBE: NEGATIVE

## 2021-12-30 RX ORDER — AMOXICILLIN AND CLAVULANATE POTASSIUM 875; 125 MG/1; MG/1
1 TABLET, FILM COATED ORAL 2 TIMES DAILY
Qty: 20 TABLET | Refills: 0 | Status: SHIPPED | OUTPATIENT
Start: 2021-12-30 | End: 2022-01-09

## 2022-01-03 ENCOUNTER — TELEPHONE (OUTPATIENT)
Dept: FAMILY MEDICINE CLINIC | Age: 52
End: 2022-01-03

## 2022-01-03 NOTE — TELEPHONE ENCOUNTER
Pt stated that her daughter tested positive. Pt stated she tested negative but her employer would like a note stating that she is off work due to the symptoms she's experiencing. Pt stated she's been off since the 23 th and hopes she will feel better by next Monday. Pt would like the note to be sent through [employer].      Please advise pt

## 2022-01-04 NOTE — TELEPHONE ENCOUNTER
Is she doing 10 days from symptoms onset? Return to work date would be 12/29/21.  She has requested through next Monday which would be 21 days off??

## 2022-01-12 ENCOUNTER — OFFICE VISIT (OUTPATIENT)
Dept: FAMILY MEDICINE CLINIC | Age: 52
End: 2022-01-12
Payer: COMMERCIAL

## 2022-01-12 VITALS
HEIGHT: 63 IN | DIASTOLIC BLOOD PRESSURE: 74 MMHG | BODY MASS INDEX: 30.12 KG/M2 | WEIGHT: 170 LBS | HEART RATE: 58 BPM | SYSTOLIC BLOOD PRESSURE: 118 MMHG | OXYGEN SATURATION: 98 %

## 2022-01-12 DIAGNOSIS — U07.1 COVID-19: Primary | ICD-10-CM

## 2022-01-12 PROCEDURE — 1036F TOBACCO NON-USER: CPT | Performed by: FAMILY MEDICINE

## 2022-01-12 PROCEDURE — G8427 DOCREV CUR MEDS BY ELIG CLIN: HCPCS | Performed by: FAMILY MEDICINE

## 2022-01-12 PROCEDURE — 3017F COLORECTAL CA SCREEN DOC REV: CPT | Performed by: FAMILY MEDICINE

## 2022-01-12 PROCEDURE — 99213 OFFICE O/P EST LOW 20 MIN: CPT | Performed by: FAMILY MEDICINE

## 2022-01-12 PROCEDURE — G8417 CALC BMI ABV UP PARAM F/U: HCPCS | Performed by: FAMILY MEDICINE

## 2022-01-12 PROCEDURE — G8484 FLU IMMUNIZE NO ADMIN: HCPCS | Performed by: FAMILY MEDICINE

## 2022-01-12 NOTE — LETTER
[facility]  [address] [place] [address],[address]  Phone: [phone]  Fax: [phone]    Natali Flaherty MD        January 12, 2022     Patient: Freedom Valentin   YOB: 1970   Date of Visit: 1/12/2022       To Whom It May Concern: It is my medical opinion that Jose Martin Greenwood should be off work until 1/17/22. She has had a slow recovery from COVID 19 diagnosed in late December. .    If you have any questions or concerns, please don't hesitate to call.     Sincerely,        Natali Flaherty MD

## 2022-01-12 NOTE — PROGRESS NOTES
Omaira Rogers (:  1970) is a 46 y.o. female,Established patient, here for evaluation of the following chief complaint(s):  Letter for School/Work (Patient is requesting return to work date be 22, has been off since 21. Patient is a  at [employer].)         ASSESSMENT/PLAN:  Danny Tom was seen today for letter for school/work. Diagnoses and all orders for this visit:    COVID-19     slow resolution. No secondary bacterial infection  Anticipate back to work . No follow-ups on file. Subjective   SUBJECTIVE/OBJECTIVE:  HPI   Pt is a of 46 y.o. female comes in today with   Chief Complaint   Patient presents with    Letter for School/Work     Patient is requesting return to work date be 22, has been off since 21. Patient is a  at [employer]. Has been off work since . Still having congestion, shortness of breath, and runny nose. Tender sore in nose from the drainage. Symptoms have been slow to improve. Sleeping all day. Vitals:    22 1559   BP: 118/74   Site: Right Upper Arm   Position: Sitting   Cuff Size: Medium Adult   Pulse: 58   SpO2: 98%   Weight: 170 lb (77.1 kg)   Height: 5' 3\" (1.6 m)      Review of Systems       Objective   Physical Exam  Constitutional:       Appearance: She is well-developed. HENT:      Right Ear: Tympanic membrane, ear canal and external ear normal.      Left Ear: Tympanic membrane, ear canal and external ear normal.      Mouth/Throat:      Pharynx: No oropharyngeal exudate. Eyes:      General: No scleral icterus. Conjunctiva/sclera: Conjunctivae normal.   Cardiovascular:      Rate and Rhythm: Normal rate and regular rhythm. Heart sounds: Normal heart sounds. No murmur heard. Pulmonary:      Effort: Pulmonary effort is normal. No respiratory distress. Breath sounds: No wheezing or rales. Musculoskeletal:      Cervical back: Neck supple.    Lymphadenopathy:      Head:      Right side of head: No submandibular or preauricular adenopathy. Left side of head: No submandibular or preauricular adenopathy. Cervical: No cervical adenopathy. Skin:     General: Skin is warm and dry. Findings: No erythema. Nails: There is no clubbing. Neurological:      Mental Status: She is alert and oriented to person, place, and time. Cranial Nerves: No cranial nerve deficit. Psychiatric:         Behavior: Behavior normal.              An electronic signature was used to authenticate this note.     --Landy Marley MD

## 2022-02-16 ENCOUNTER — NURSE TRIAGE (OUTPATIENT)
Dept: OTHER | Facility: CLINIC | Age: 52
End: 2022-02-16

## 2022-02-16 NOTE — TELEPHONE ENCOUNTER
Received call from [facility] with Red Flag Complaint. Subjective: Caller states \"I'm having a really bad migraine\"     Current Symptoms: Migraine HA (left temple) x 3 days, left eye pain (behind eye) with watery drainage, runny nose, post-nose drip. Constant chest tightness (5/10) x 2 days--last time yesterday, assoc with one dizzy spell x 1 minute. Slight cough productive for yellow sputum    Onset: 3 days ago; gradual    Associated Symptoms: NA    Pain Severity: 9/10 headache; aching; waxing and waning    Temperature: denies fever, chills, sweats    What has been tried: Excedrin wihtout relief    LMP: post menopausa Pregnant: No    Recommended disposition: Recommended UCC. Pt agreed. Care advice provided, patient verbalizes understanding; denies any other questions or concerns; instructed to call back for any new or worsening symptoms. Patient/caller proceeding to nearest [address]      Attention Provider: Thank you for allowing me to participate in the care of your patient. The patient was connected to triage in response to information provided to the [employer]/PSC. Please do not respond through this encounter as the response is not directed to a shared pool.           Reason for Disposition   Chest pain or 'angina' comes and goes and is happening more often (increasing in frequency) or getting worse (increasing in severity) (Exception: chest pains that last only a few seconds)    Protocols used: CHEST PAIN-ADULT-[place]

## 2022-03-21 NOTE — TELEPHONE ENCOUNTER
I tried to call the patient at the home number listed. No answer and a message that stated I could not leave a message since the mailbox is FULL.       I will leave this phone note open.

## 2022-04-05 ENCOUNTER — NURSE TRIAGE (OUTPATIENT)
Dept: OTHER | Facility: CLINIC | Age: 52
End: 2022-04-05

## 2022-04-06 ENCOUNTER — OFFICE VISIT (OUTPATIENT)
Dept: FAMILY MEDICINE CLINIC | Age: 52
End: 2022-04-06
Payer: COMMERCIAL

## 2022-04-06 VITALS
OXYGEN SATURATION: 98 % | HEIGHT: 63 IN | TEMPERATURE: 97.7 F | HEART RATE: 76 BPM | BODY MASS INDEX: 29.06 KG/M2 | DIASTOLIC BLOOD PRESSURE: 72 MMHG | SYSTOLIC BLOOD PRESSURE: 106 MMHG | WEIGHT: 164 LBS

## 2022-04-06 DIAGNOSIS — B34.9 VIRAL SYNDROME: Primary | ICD-10-CM

## 2022-04-06 PROCEDURE — 3017F COLORECTAL CA SCREEN DOC REV: CPT | Performed by: FAMILY MEDICINE

## 2022-04-06 PROCEDURE — 1036F TOBACCO NON-USER: CPT | Performed by: FAMILY MEDICINE

## 2022-04-06 PROCEDURE — G8427 DOCREV CUR MEDS BY ELIG CLIN: HCPCS | Performed by: FAMILY MEDICINE

## 2022-04-06 PROCEDURE — 99213 OFFICE O/P EST LOW 20 MIN: CPT | Performed by: FAMILY MEDICINE

## 2022-04-06 PROCEDURE — G8417 CALC BMI ABV UP PARAM F/U: HCPCS | Performed by: FAMILY MEDICINE

## 2022-04-06 ASSESSMENT — PATIENT HEALTH QUESTIONNAIRE - PHQ9
SUM OF ALL RESPONSES TO PHQ QUESTIONS 1-9: 0
SUM OF ALL RESPONSES TO PHQ QUESTIONS 1-9: 0
1. LITTLE INTEREST OR PLEASURE IN DOING THINGS: 0
SUM OF ALL RESPONSES TO PHQ QUESTIONS 1-9: 0
2. FEELING DOWN, DEPRESSED OR HOPELESS: 0
SUM OF ALL RESPONSES TO PHQ QUESTIONS 1-9: 0
SUM OF ALL RESPONSES TO PHQ9 QUESTIONS 1 & 2: 0

## 2022-04-06 NOTE — PROGRESS NOTES
Adriane Hein (:  1970) is a 46 y.o. female,Established patient, here for evaluation of the following chief complaint(s):  Head Congestion (Developed Sat., negative COVID test on Sun. Treating with nyquil.), Cough, Red Eye, Drainage, Headache (Resolved), Fatigue, Anorexia (Decreased appetite), and Taste Change         ASSESSMENT/PLAN:  Amarilis was seen today for head congestion, cough, red eye, drainage, headache, fatigue, anorexia and taste change. Diagnoses and all orders for this visit:    Viral syndrome    Reviewed diagnosis of bronchitis and differential diagnosis, including post infectious cough. Prescription medications for symptom relief reviewed, as well as their possible side effects and adverse effects. Supportive care including rest and otc treatments (honey, lemon, tea, humidified air) reviewed. Call if worsening cough or chest pain, fever, chills, or myalgias develop. No follow-ups on file. Subjective   SUBJECTIVE/OBJECTIVE:  GERTRUDE   Pt is a of 46 y.o. female comes in today with   Chief Complaint   Patient presents with    Head Congestion     Developed Sat., negative COVID test on Sun. Treating with nyquil.  Cough    Red Eye    Drainage    Headache     Resolved    Fatigue    Anorexia     Decreased appetite    Taste Change     Coworker was sick last week  Sat am symptoms developed. Congestion, runny nose, headache, shortness of breath. Tired. In bed all day sat and . A little better today. Still some shortness of breath and cough. Vitals:    22 0919   BP: 106/72   Site: Left Upper Arm   Position: Sitting   Cuff Size: Small Adult   Pulse: 76   Temp: 97.7 °F (36.5 °C)   TempSrc: Temporal   SpO2: 98%   Weight: 164 lb (74.4 kg)   Height: 5' 3\" (1.6 m)        Review of Systems       Objective   Physical Exam  Constitutional:       Appearance: She is well-developed.    HENT:      Right Ear: Tympanic membrane, ear canal and external ear normal.      Left Ear: Tympanic membrane, ear canal and external ear normal.      Mouth/Throat:      Pharynx: No oropharyngeal exudate. Eyes:      General: No scleral icterus. Conjunctiva/sclera: Conjunctivae normal.   Cardiovascular:      Rate and Rhythm: Normal rate and regular rhythm. Heart sounds: Normal heart sounds. No murmur heard. Pulmonary:      Effort: Pulmonary effort is normal. No respiratory distress. Breath sounds: No wheezing or rales. Musculoskeletal:      Cervical back: Neck supple. Lymphadenopathy:      Head:      Right side of head: No submandibular or preauricular adenopathy. Left side of head: No submandibular or preauricular adenopathy. Cervical: No cervical adenopathy. Skin:     General: Skin is warm and dry. Findings: No erythema. Nails: There is no clubbing. Neurological:      Mental Status: She is alert and oriented to person, place, and time. Cranial Nerves: No cranial nerve deficit. Psychiatric:         Behavior: Behavior normal.              An electronic signature was used to authenticate this note.     --Sebastián Garcia MD

## 2022-04-06 NOTE — LETTER
[facility]  [address] [place] [address],[address]  Phone: [phone]  Fax: [phone]    Tonja Ross MD        April 6, 2022     Patient: Deanna Yanez   YOB: 1970   Date of Visit: 4/6/2022       To Whom It May Concern: It is my medical opinion that [place] should be excused from work 4/2, 4/3, 4/4, 4/5, 4/6, and 4/7 due to illness. If you have any questions or concerns, please don't hesitate to call.     Sincerely,        Tonja Ross MD

## 2022-04-11 ENCOUNTER — TELEPHONE (OUTPATIENT)
Dept: FAMILY MEDICINE CLINIC | Age: 52
End: 2022-04-11

## 2022-04-25 NOTE — TELEPHONE ENCOUNTER
I tried to call the patient at the home number listed. No answer and a message that stated I could not leave a message since the mailbox is FULL.      The patient was in the office to see Dr. Patricia Mckoy 10-. If the patient calls to follow up on the phone note below she will need to come back into the office to be seen and start again, since it has been more then 6 months since her original office visit. I will remove the surgery letter from the letter queue. I will close this phone note.

## 2022-06-28 PROBLEM — R51.9 NONINTRACTABLE EPISODIC HEADACHE: Status: ACTIVE | Noted: 2017-07-10

## 2022-06-28 PROBLEM — K59.00 CONSTIPATION: Status: ACTIVE | Noted: 2022-06-28

## 2022-06-29 ENCOUNTER — OFFICE VISIT (OUTPATIENT)
Dept: FAMILY MEDICINE CLINIC | Age: 52
End: 2022-06-29
Payer: COMMERCIAL

## 2022-06-29 VITALS
WEIGHT: 164 LBS | HEIGHT: 63 IN | DIASTOLIC BLOOD PRESSURE: 76 MMHG | BODY MASS INDEX: 29.06 KG/M2 | OXYGEN SATURATION: 97 % | HEART RATE: 67 BPM | SYSTOLIC BLOOD PRESSURE: 116 MMHG

## 2022-06-29 DIAGNOSIS — T14.8XXA BRUISING: ICD-10-CM

## 2022-06-29 DIAGNOSIS — Z13.220 SCREENING CHOLESTEROL LEVEL: ICD-10-CM

## 2022-06-29 DIAGNOSIS — T14.8XXA BRUISING: Primary | ICD-10-CM

## 2022-06-29 DIAGNOSIS — Z13.1 DIABETES MELLITUS SCREENING: ICD-10-CM

## 2022-06-29 DIAGNOSIS — Z12.31 ENCOUNTER FOR SCREENING MAMMOGRAM FOR MALIGNANT NEOPLASM OF BREAST: ICD-10-CM

## 2022-06-29 LAB
CHOLESTEROL, TOTAL: 179 MG/DL (ref 0–199)
GLUCOSE FASTING: 83 MG/DL (ref 70–99)
HCT VFR BLD CALC: 38.7 % (ref 36–48)
HDLC SERPL-MCNC: 69 MG/DL (ref 40–60)
HEMOGLOBIN: 12.8 G/DL (ref 12–16)
LDL CHOLESTEROL CALCULATED: 103 MG/DL
MCH RBC QN AUTO: 29.1 PG (ref 26–34)
MCHC RBC AUTO-ENTMCNC: 33 G/DL (ref 31–36)
MCV RBC AUTO: 88.2 FL (ref 80–100)
PDW BLD-RTO: 15.2 % (ref 12.4–15.4)
PLATELET # BLD: 188 K/UL (ref 135–450)
PMV BLD AUTO: 8.7 FL (ref 5–10.5)
RBC # BLD: 4.38 M/UL (ref 4–5.2)
TRIGL SERPL-MCNC: 36 MG/DL (ref 0–150)
VLDLC SERPL CALC-MCNC: 7 MG/DL
WBC # BLD: 4.4 K/UL (ref 4–11)

## 2022-06-29 PROCEDURE — 99213 OFFICE O/P EST LOW 20 MIN: CPT | Performed by: FAMILY MEDICINE

## 2022-06-29 PROCEDURE — G8417 CALC BMI ABV UP PARAM F/U: HCPCS | Performed by: FAMILY MEDICINE

## 2022-06-29 PROCEDURE — G8427 DOCREV CUR MEDS BY ELIG CLIN: HCPCS | Performed by: FAMILY MEDICINE

## 2022-06-29 PROCEDURE — 1036F TOBACCO NON-USER: CPT | Performed by: FAMILY MEDICINE

## 2022-06-29 PROCEDURE — 3017F COLORECTAL CA SCREEN DOC REV: CPT | Performed by: FAMILY MEDICINE

## 2022-06-29 NOTE — PROGRESS NOTES
Reinaldo Sosa (:  1970) is a 46 y.o. female,Established patient, here for evaluation of the following chief complaint(s):  Bleeding/Bruising (Complains of bruising on legs that has resolved, but now has one on the right side of her abdomen.)         ASSESSMENT/PLAN:  Amarilis was seen today for bleeding/bruising. Diagnoses and all orders for this visit:    Bruising  -     CBC; Future  differential diagnosis reviewed   Checking cbc to evaluate  Encounter for screening mammogram for malignant neoplasm of breast  -     ИВАН DIGITAL SCREEN W OR WO CAD BILATERAL; Future    Screening cholesterol level  -     Lipid Panel; Future    Diabetes mellitus screening  -     Glucose, Fasting; Future    Other orders  -     Cancel: Tdap, BOOSTRIX, (age 8 yrs+), IM  -     Cancel: Zoster, SHINGRIX, (18 yrs +), IM         No follow-ups on file. Subjective   SUBJECTIVE/OBJECTIVE:  HPI   Pt is a of 46 y.o. female comes in today with   Chief Complaint   Patient presents with    Bleeding/Bruising     Complains of bruising on legs that has resolved, but now has one on the right side of her abdomen. few bruises on legs and one on abd. Doesn't recall injury   abd tender  No easy bleeding  Vitals:    22 0840   BP: 116/76   Site: Left Upper Arm   Position: Sitting   Cuff Size: Medium Adult   Pulse: 67   SpO2: 97%   Weight: 164 lb (74.4 kg)   Height: 5' 3\" (1.6 m)        Review of Systems   Constitutional: Negative. Hematological: Negative for adenopathy. Objective   Physical Exam         An electronic signature was used to authenticate this note.     --Dionna Rolon MD

## 2022-07-30 ENCOUNTER — HOSPITAL ENCOUNTER (EMERGENCY)
Age: 52
Discharge: HOME | End: 2022-07-30
Payer: COMMERCIAL

## 2022-07-30 ENCOUNTER — APPOINTMENT (OUTPATIENT)
Dept: GENERAL RADIOLOGY | Age: 52
End: 2022-07-30
Payer: COMMERCIAL

## 2022-07-30 VITALS
DIASTOLIC BLOOD PRESSURE: 78 MMHG | OXYGEN SATURATION: 96 % | RESPIRATION RATE: 16 BRPM | WEIGHT: 160.27 LBS | HEART RATE: 90 BPM | BODY MASS INDEX: 28.4 KG/M2 | TEMPERATURE: 97.2 F | SYSTOLIC BLOOD PRESSURE: 111 MMHG | HEIGHT: 63 IN

## 2022-07-30 DIAGNOSIS — U07.1 COVID-19: Primary | ICD-10-CM

## 2022-07-30 DIAGNOSIS — K59.00 CONSTIPATION, UNSPECIFIED CONSTIPATION TYPE: ICD-10-CM

## 2022-07-30 LAB
RAPID INFLUENZA  B AGN: NEGATIVE
RAPID INFLUENZA A AGN: NEGATIVE
SARS-COV-2, NAAT: DETECTED

## 2022-07-30 PROCEDURE — 99284 EMERGENCY DEPT VISIT MOD MDM: CPT

## 2022-07-30 PROCEDURE — 87635 SARS-COV-2 COVID-19 AMP PRB: CPT

## 2022-07-30 PROCEDURE — 87804 INFLUENZA ASSAY W/OPTIC: CPT

## 2022-07-30 PROCEDURE — 6370000000 HC RX 637 (ALT 250 FOR IP)

## 2022-07-30 PROCEDURE — 74018 RADEX ABDOMEN 1 VIEW: CPT

## 2022-07-30 RX ORDER — ACETAMINOPHEN 500 MG
1000 TABLET ORAL ONCE
Status: COMPLETED | OUTPATIENT
Start: 2022-07-30 | End: 2022-07-30

## 2022-07-30 RX ADMIN — IBUPROFEN 600 MG: 200 TABLET, FILM COATED ORAL at 09:36

## 2022-07-30 RX ADMIN — ACETAMINOPHEN 1000 MG: 500 TABLET ORAL at 09:36

## 2022-07-30 ASSESSMENT — ENCOUNTER SYMPTOMS
VOMITING: 0
BACK PAIN: 0
CONSTIPATION: 1
SHORTNESS OF BREATH: 0
RHINORRHEA: 0
EYE PAIN: 0
ABDOMINAL PAIN: 0
NAUSEA: 0
TROUBLE SWALLOWING: 0
COUGH: 0
SORE THROAT: 1
DIARRHEA: 0

## 2022-07-30 ASSESSMENT — PAIN DESCRIPTION - LOCATION: LOCATION: HEAD

## 2022-07-30 ASSESSMENT — PAIN SCALES - GENERAL
PAINLEVEL_OUTOF10: 8
PAINLEVEL_OUTOF10: 8

## 2022-07-30 ASSESSMENT — PAIN DESCRIPTION - DESCRIPTORS: DESCRIPTORS: ACHING

## 2022-07-30 ASSESSMENT — PAIN - FUNCTIONAL ASSESSMENT
PAIN_FUNCTIONAL_ASSESSMENT: 0-10
PAIN_FUNCTIONAL_ASSESSMENT: NONE - DENIES PAIN

## 2022-07-30 NOTE — ED PROVIDER NOTES
[address] [facility]        Pt Name: Garry Lainez  MRN: [id number]  [place] 1970  Date of evaluation: 7/30/2022  Provider: AIDE Prado - CNP  PCP: Edwin Aggarwal MD  Note Started: 9:29 AM EDT      DES. I have evaluated this patient. My supervising physician was available for consultation. Triage CHIEF COMPLAINT       Chief Complaint   Patient presents with    Generalized Body Aches     Since wed         HISTORY OF PRESENT ILLNESS   (Location/Symptom, Timing/Onset, Context/Setting, Quality, Duration, Modifying Factors, Severity)  Note limiting factors. Chief Complaint: Brent Lainez is a 46 y.o. female who presents to the ED for evaluation of multiple complaints. She reports generalized body aches, sore throat, constipation, chills and headache all of which started Wednesday. Denies any sick contacts. Has not been able to check temperature at home. No meds prior to arrival.  Vaccinated against COVID-19. Nursing Notes were all reviewed and agreed with or any disagreements were addressed in the HPI. REVIEW OF SYSTEMS    (2-9 systems for level 4, 10 or more for level 5)     Review of Systems   Constitutional:  Positive for chills and diaphoresis. Negative for fever. HENT:  Positive for sore throat. Negative for congestion, rhinorrhea and trouble swallowing. Eyes:  Negative for pain and visual disturbance. Respiratory:  Negative for cough and shortness of breath. Cardiovascular:  Negative for chest pain and leg swelling. Gastrointestinal:  Positive for constipation. Negative for abdominal pain, diarrhea, nausea and vomiting. Genitourinary:  Negative for frequency and hematuria. Musculoskeletal:  Positive for myalgias. Negative for back pain and neck pain. Skin:  Negative for rash and wound. Neurological:  Positive for headaches. Negative for dizziness and light-headedness. PAST MEDICAL HISTORY     Past Medical History:   Diagnosis Date    Headache        SURGICAL HISTORY     Past Surgical History:   Procedure Laterality Date     SECTION      x 2    CHOLECYSTECTOMY      HYSTERECTOMY (CERVIX STATUS UNKNOWN)         CURRENTMEDICATIONS       Discharge Medication List as of 2022 11:08 AM        CONTINUE these medications which have NOT CHANGED    Details   albuterol sulfate HFA (PROAIR HFA) 108 (90 Base) MCG/ACT inhaler Inhale 2 puffs into the lungs every 6 hours as needed for Wheezing, Disp-18 g, R-3Normal      triamcinolone (KENALOG) 0.1 % cream Apply topically 2 times daily. , Disp-80 g, R-1, Normal      ibuprofen (IBU) 800 MG tablet Take 1 tablet by mouth every 8 hours as needed for Pain, Disp-20 tablet, R-0Normal      fluticasone (FLONASE) 50 MCG/ACT nasal spray 2 sprays by Nasal route daily, Disp-1 Bottle, R-3Normal      Naproxen Sodium (ALEVE) 220 MG CAPS Take 3 capsules by mouth dailyHistorical Med             ALLERGIES     Patient has no known allergies.     FAMILYHISTORY       Family History   Problem Relation Age of Onset    Diabetes Mother     High Blood Pressure Mother     Diabetes Maternal Grandmother         SOCIAL HISTORY       Social History     Socioeconomic History    Marital status:      Spouse name: None    Number of children: None    Years of education: None    Highest education level: None   Tobacco Use    Smoking status: Never    Smokeless tobacco: Never   Substance and Sexual Activity    Alcohol use: No    Drug use: No    Sexual activity: Not Currently     Partners: Male       SCREENINGS    [place] Coma Scale  Eye Opening: Spontaneous  Best Verbal Response: Oriented  Best Motor Response: Obeys commands  [place] Coma Scale Score: 15        PHYSICAL EXAM    (up to 7 for level 4, 8 or more for level 5)     ED Triage Vitals [22 0912]   BP Temp Temp Source Heart Rate Resp SpO2 Height Weight   110/81 97.2 °F (36.2 °C) Temporal 90 16 95 % 5' 3\" (1.6 m) 160 lb 4.4 oz (72.7 kg)       Physical Exam  Vitals and nursing note reviewed. Constitutional:       Appearance: Normal appearance. She is obese. She is ill-appearing. She is not toxic-appearing or diaphoretic. HENT:      Head: Normocephalic and atraumatic. Right Ear: External ear normal.      Left Ear: External ear normal.      Nose: Nose normal. No congestion or rhinorrhea. Mouth/Throat:      Mouth: Mucous membranes are dry. Pharynx: Oropharynx is clear. No oropharyngeal exudate or posterior oropharyngeal erythema. Eyes:      General: No scleral icterus. Right eye: No discharge. Left eye: No discharge. Extraocular Movements: Extraocular movements intact. Conjunctiva/sclera: Conjunctivae normal.      Pupils: Pupils are equal, round, and reactive to light. Cardiovascular:      Rate and Rhythm: Normal rate and regular rhythm. Pulses: Normal pulses. Heart sounds: Normal heart sounds. No murmur heard. No friction rub. No gallop. Pulmonary:      Effort: Pulmonary effort is normal. No respiratory distress. Breath sounds: Normal breath sounds. No stridor. No wheezing, rhonchi or rales. Abdominal:      General: Abdomen is flat. Bowel sounds are normal. There is no distension. Palpations: Abdomen is soft. Tenderness: There is no abdominal tenderness. There is no right CVA tenderness, left CVA tenderness or guarding. Musculoskeletal:         General: No deformity. Normal range of motion. Cervical back: Normal range of motion and neck supple. No rigidity or tenderness. Skin:     General: Skin is warm and dry. Capillary Refill: Capillary refill takes less than 2 seconds. Coloration: Skin is not jaundiced or pale. Findings: No rash. Neurological:      General: No focal deficit present. Mental Status: She is alert and oriented to person, place, and time. Mental status is at baseline.    Psychiatric: Mood and Affect: Mood normal.         Behavior: Behavior normal.       DIAGNOSTIC RESULTS   LABS:    Labs Reviewed   COVID-19, RAPID - Abnormal; Notable for the following components:       Result Value    SARS-CoV-2, NAAT DETECTED (*)     All other components within normal limits   RAPID INFLUENZA A/B ANTIGENS       When ordered, only abnormal lab results are displayed. All other labs were within normal range or not returned as of this dictation. EKG: When ordered, EKG's are interpreted by the Emergency Department Physician in the absence of a cardiologist.  Please see their note for interpretation of EKG. RADIOLOGY:   Non-plain film images such as CT, Ultrasound and MRI are read by the radiologist. Jim Heman radiographic images are visualized andpreliminarily interpreted by the  ED Provider with the below findings:        Interpretation perthe Radiologist below, if available at the time of this note:    XR ABDOMEN (KUB) (SINGLE AP VIEW)   Final Result   1. Nonobstructive bowel gas pattern. No results found. PROCEDURES   Unless otherwise noted below, none     Procedures    CRITICAL CARE TIME   I Patricia CNP, am the primary clinician of record  Total Critical Care time was 15 minutes, excluding separately reportable procedures. There was a high probability of clinically significant/life threatening deterioration in the patient's condition which required my urgent intervention.   This time spent assessing, reassessing patient, chart review and discussing case with other providers      CONSULTS:  None      EMERGENCY DEPARTMENT COURSE and DIFFERENTIAL DIAGNOSIS/MDM:   Vitals:    Vitals:    07/30/22 0912 07/30/22 1114   BP: 110/81 111/78   Pulse: 90 90   Resp: 16 16   Temp: 97.2 °F (36.2 °C) 97.2 °F (36.2 °C)   TempSrc: Temporal Oral   SpO2: 95% 96%   Weight: 160 lb 4.4 oz (72.7 kg)    Height: 5' 3\" (1.6 m)        Patient was given thefollowing medications:  Medications   acetaminophen (TYLENOL) tablet 1,000 mg (1,000 mg Oral Given 7/30/22 0936)   ibuprofen (ADVIL;MOTRIN) tablet 600 mg (600 mg Oral Given 7/30/22 0936)         Is this patient to be included in the SEP-1 Core Measure due to severe sepsis or septic shock? No   Exclusion criteria - the patient is NOT to be included for SEP-1 Core Measure due to:  Viral etiology found or highly suspected (including COVID-19) without concomitant bacterial infection      41-year-old female presenting to ED with concern for generalized body aches, myalgias, sore throat and constipation as above. This is a pleasant patient with an COVID-19 infection. On lung exam, patient does not have any wheezing, Rales, rhonchi and is not in any respiratory distress. There is no significant evidence of severe respiratory distress, severe upper or lower airway compromise, hypoxemia, toxicity, shock, sepsis, hemodynamic or cardiopulmonary instability, epiglottitis, peritonsillar abscess, retropharyngeal abscess, bacterial tracheitis, pneumonia, or any disease process requiring other immediate surgical or medical intervention at this time. The patient is vaccinated as such appropriate isolation precautions were discussed with them. It is understood that if the patient is not improving as expected or if other new symptoms or signs of concern develop, other etiologies or diagnoses may need to be considered requiring other tests, treatments, consultations, and/or admission. The diagnosis, plan, expected course, follow-up, and return precautions were discussed and all questions were answered. Labs:  COVID-positive, flu negative    KUB was ordered because of constipation since Monday. Results as above. Patient is encouraged to take over-the-counter Tylenol, Motrin for symptom relief as well as over-the-counter MiraLAX as needed for constipation    FINAL IMPRESSION      1. COVID-19    2.  Constipation, unspecified constipation type          DISPOSITION/PLAN DISPOSITION Decision To Discharge 07/30/2022 11:05:13 AM      PATIENT REFERREDTO:  Padma Loco MD  [address] [place] [address]  [phone]    Call in 1 day  Follow up on your recent ED visit    DISCHARGE MEDICATIONS:  Discharge Medication List as of 7/30/2022 11:08 AM          DISCONTINUED MEDICATIONS:  Discharge Medication List as of 7/30/2022 11:08 AM                 (Please note that portions ofthis note were completed with a voice recognition program.  Efforts were made to edit the dictations but occasionally words are mis-transcribed.)    AIDE Rincon CNP (electronically signed)             AIDE Rincon CNP  07/30/22 1142

## 2022-07-30 NOTE — Clinical Note
Ming Spence was seen and treated in our emergency department on 7/30/2022. COVID19 virus is suspected. Per the CDC guidelines we recommend home isolation until the following conditions are all met:    1. At least five days have passed since symptoms first appeared and/or had a close exposure,   2. After home isolation for five days, wearing a mask around others for the next five days,  3. At least 24 have passed since last fever without the use of fever-reducing medications and  4. Symptoms (eg cough, shortness of breath) have improved    If you have any questions or concerns, please don't hesitate to call.     She may return to work/school on 08/06/2022        Patricia Bland, APRN - CNP

## 2022-07-30 NOTE — DISCHARGE INSTRUCTIONS

## 2022-10-21 ENCOUNTER — OFFICE VISIT (OUTPATIENT)
Dept: FAMILY MEDICINE CLINIC | Age: 52
End: 2022-10-21
Payer: COMMERCIAL

## 2022-10-21 VITALS
BODY MASS INDEX: 30.12 KG/M2 | HEART RATE: 53 BPM | SYSTOLIC BLOOD PRESSURE: 114 MMHG | WEIGHT: 170 LBS | DIASTOLIC BLOOD PRESSURE: 70 MMHG | OXYGEN SATURATION: 100 % | HEIGHT: 63 IN | RESPIRATION RATE: 14 BRPM

## 2022-10-21 DIAGNOSIS — Z00.00 WELL ADULT EXAM: Primary | ICD-10-CM

## 2022-10-21 DIAGNOSIS — G43.009 MIGRAINE WITHOUT AURA AND WITHOUT STATUS MIGRAINOSUS, NOT INTRACTABLE: ICD-10-CM

## 2022-10-21 PROCEDURE — 99396 PREV VISIT EST AGE 40-64: CPT | Performed by: FAMILY MEDICINE

## 2022-10-21 PROCEDURE — G8484 FLU IMMUNIZE NO ADMIN: HCPCS | Performed by: FAMILY MEDICINE

## 2022-10-21 RX ORDER — RIMEGEPANT SULFATE 75 MG/75MG
TABLET, ORALLY DISINTEGRATING ORAL
Qty: 10 TABLET | Refills: 2 | Status: SHIPPED | OUTPATIENT
Start: 2022-10-21

## 2022-10-21 SDOH — ECONOMIC STABILITY: FOOD INSECURITY: WITHIN THE PAST 12 MONTHS, YOU WORRIED THAT YOUR FOOD WOULD RUN OUT BEFORE YOU GOT MONEY TO BUY MORE.: NEVER TRUE

## 2022-10-21 SDOH — ECONOMIC STABILITY: FOOD INSECURITY: WITHIN THE PAST 12 MONTHS, THE FOOD YOU BOUGHT JUST DIDN'T LAST AND YOU DIDN'T HAVE MONEY TO GET MORE.: NEVER TRUE

## 2022-10-21 ASSESSMENT — ENCOUNTER SYMPTOMS: RESPIRATORY NEGATIVE: 1

## 2022-10-21 ASSESSMENT — SOCIAL DETERMINANTS OF HEALTH (SDOH): HOW HARD IS IT FOR YOU TO PAY FOR THE VERY BASICS LIKE FOOD, HOUSING, MEDICAL CARE, AND HEATING?: NOT HARD AT ALL

## 2022-10-21 NOTE — PROGRESS NOTES
Jeanine Elaine (:  1970) is a 45 y.o. female,Established patient, here for evaluation of the following chief complaint(s):  Follow-up (Migraines, needs FMLA recert/)         ASSESSMENT/PLAN:  Vaishali Li was seen today for follow-up. Diagnoses and all orders for this visit:    Well adult exam  Good diet and exercise  blood work good in   Migraine without aura and without status migrainosus, not intractable  Not well controlled on imitrex. Will try nurtec  Other orders  -     Rimegepant Sulfate (NURTEC) 75 MG TBDP; 1 po daily prn migraines       No follow-ups on file. Subjective   SUBJECTIVE/OBJECTIVE:  HPI  Pt is a of 45 y.o. female comes in today with   Chief Complaint   Patient presents with    Follow-up     Migraines, needs FMLA recert       Migraines have been stable  Still has to miss work when they occur  Has imitrex/excedrin  Running more  Vitals:    10/21/22 0904   BP: 114/70   Pulse: 53   Resp: 14   SpO2: 100%   Weight: 170 lb (77.1 kg)   Height: 5' 3\" (1.6 m)       Past Medical History:Reviewed  Medications:Reviewed. No Known Allergies   Social hx:Reviewed. Social History     Tobacco Use   Smoking Status Never   Smokeless Tobacco Never          Review of Systems   Constitutional: Negative. Respiratory: Negative. Cardiovascular: Negative. Objective   Physical Exam  Constitutional:       General: She is not in acute distress. Appearance: She is well-developed. She is not diaphoretic. HENT:      Head: Normocephalic and atraumatic. Eyes:      General: No scleral icterus. Neck:      Thyroid: No thyroid mass or thyromegaly. Trachea: No tracheal deviation. Cardiovascular:      Rate and Rhythm: Normal rate and regular rhythm. Heart sounds: Normal heart sounds. No murmur heard. Pulmonary:      Effort: Pulmonary effort is normal.      Breath sounds: Normal breath sounds. Musculoskeletal:      Cervical back: Normal range of motion and neck supple. Lymphadenopathy:      Head:      Right side of head: No submandibular adenopathy. Left side of head: No submandibular adenopathy. Cervical: No cervical adenopathy. Skin:     General: Skin is warm and dry. Findings: No rash. Neurological:      Mental Status: She is alert and oriented to person, place, and time. Cranial Nerves: No cranial nerve deficit. Psychiatric:         Behavior: Behavior normal.         Thought Content: Thought content normal.         Judgment: Judgment normal.            An electronic signature was used to authenticate this note.     --Natalie Salcido MD

## 2022-11-21 ENCOUNTER — TELEMEDICINE (OUTPATIENT)
Dept: PRIMARY CARE CLINIC | Age: 52
End: 2022-11-21
Payer: COMMERCIAL

## 2022-11-21 DIAGNOSIS — R06.2 WHEEZING: Primary | ICD-10-CM

## 2022-11-21 DIAGNOSIS — R05.1 ACUTE COUGH: ICD-10-CM

## 2022-11-21 DIAGNOSIS — R06.02 SOB (SHORTNESS OF BREATH): ICD-10-CM

## 2022-11-21 PROBLEM — J02.9 SORE THROAT: Status: ACTIVE | Noted: 2022-11-21

## 2022-11-21 PROCEDURE — 99213 OFFICE O/P EST LOW 20 MIN: CPT | Performed by: NURSE PRACTITIONER

## 2022-11-21 PROCEDURE — G8427 DOCREV CUR MEDS BY ELIG CLIN: HCPCS | Performed by: NURSE PRACTITIONER

## 2022-11-21 PROCEDURE — 3017F COLORECTAL CA SCREEN DOC REV: CPT | Performed by: NURSE PRACTITIONER

## 2022-11-21 RX ORDER — BENZONATATE 100 MG/1
100 CAPSULE ORAL 3 TIMES DAILY PRN
Qty: 30 CAPSULE | Refills: 0 | Status: SHIPPED | OUTPATIENT
Start: 2022-11-21 | End: 2022-11-28

## 2022-11-21 RX ORDER — PREDNISONE 10 MG/1
10 TABLET ORAL DAILY
Qty: 18 TABLET | Refills: 0 | Status: SHIPPED | OUTPATIENT
Start: 2022-11-21 | End: 2022-11-30

## 2022-11-21 RX ORDER — ALBUTEROL SULFATE 90 UG/1
2 AEROSOL, METERED RESPIRATORY (INHALATION) EVERY 6 HOURS PRN
Qty: 18 G | Refills: 3 | Status: SHIPPED | OUTPATIENT
Start: 2022-11-21

## 2022-11-21 ASSESSMENT — ENCOUNTER SYMPTOMS
COUGH: 1
SINUS PRESSURE: 1
WHEEZING: 1
CHEST TIGHTNESS: 1
SHORTNESS OF BREATH: 1

## 2022-11-21 NOTE — PROGRESS NOTES
Mathieu Hernandez (:  1970) is a Established patient, here for evaluation of the following:    ASSESSMENT/PLAN:  Below is the assessment and plan developed based on review of pertinent history, physical exam, labs, studies, and medications. 1. Wheezing  - Prednisone taper for 9 days  -     albuterol sulfate HFA (PROAIR HFA) 108 (90 Base) MCG/ACT inhaler; Inhale 2 puffs into the lungs every 6 hours as needed for Wheezing, Disp-18 g, R-3Normal  2. SOB (shortness of breath)  -Prednisone taper 9 days  3. Cough  -benzonatate 100 mg TID  -     albuterol sulfate HFA (PROAIR HFA) 108 (90 Base) MCG/ACT inhaler; Inhale 2 puffs into the lungs every 6 hours as needed for Wheezing, Disp-18 g, R-3Normal  No follow-ups on file. SUBJECTIVE/OBJECTIVE:  This is a 45year old patient of Dr. Amarjit Tobar consenting to a virtual visit today. Complaints include she has had to use her inhaler more frequently. She has increased congestion. It is worse at night. She has increased wheezing and shortness of breath. She has had issues since she had COVID in July. Treatments tried at home include albuterol inhaler. Requested she follow up with PCP in person. Review of Systems   Constitutional:  Positive for fatigue. HENT:  Positive for congestion, postnasal drip and sinus pressure. Respiratory:  Positive for cough, chest tightness, shortness of breath and wheezing. SOB with activity   Musculoskeletal:  Positive for myalgias.      Patient-Reported Vitals 1/15/2021   Patient-Reported Weight 165lb   Patient-Reported Height 5 3   Patient-Reported Temperature -         Physical Exam    [INSTRUCTIONS:  \"[x]\" Indicates a positive item  \"[]\" Indicates a negative item  -- DELETE ALL ITEMS NOT EXAMINED]    Constitutional: [x] Appears well-developed and well-nourished [x] No apparent distress      [] Abnormal -     Mental status: [x] Alert and awake  [x] Oriented to person/place/time [x] Able to follow commands    [] Abnormal - Eyes:   EOM    [x]  Normal    [] Abnormal -   Sclera  [x]  Normal    [] Abnormal -          Discharge [x]  None visible   [] Abnormal -     HENT: [x] Normocephalic, atraumatic  [] Abnormal -   [x] Mouth/Throat: Mucous membranes are moist    External Ears [x] Normal  [] Abnormal -    Neck: [x] No visualized mass [] Abnormal -     Pulmonary/Chest: [x] Respiratory effort normal   [x] No visualized signs of difficulty breathing or respiratory distress        [] Abnormal -      Musculoskeletal:   [x] Normal gait with no signs of ataxia         [x] Normal range of motion of neck        [] Abnormal -     Neurological:        [x] No Facial Asymmetry (Cranial nerve 7 motor function) (limited exam due to video visit)          [x] No gaze palsy        [] Abnormal -          Skin:        [x] No significant exanthematous lesions or discoloration noted on facial skin         [] Abnormal -            Psychiatric:       [x] Normal Affect [] Abnormal -        [x] No Hallucinations    Other pertinent observable physical exam findings:-      On this date 11/21/2022 I have spent 20 minutes reviewing previous notes, test results and face to face (virtual) with the patient discussing the diagnosis and importance of compliance with the treatment plan as well as documenting on the day of the visit. Zeinab Vail, was evaluated through a synchronous (real-time) audio-video encounter. The patient (or guardian if applicable) is aware that this is a billable service, which includes applicable co-pays. This Virtual Visit was conducted with patient's (and/or legal guardian's) consent. The visit was conducted pursuant to the emergency declaration under the [address] [place] [address] waiver authority and the [employer] General Act. Patient identification was verified, and a caregiver was present when appropriate.  The patient was located at home in a state where the provider was licensed to provide care.     --AIDE Morales

## 2022-12-06 ENCOUNTER — COMMUNITY OUTREACH (OUTPATIENT)
Dept: FAMILY MEDICINE CLINIC | Age: 52
End: 2022-12-06

## 2022-12-06 NOTE — PROGRESS NOTES
Patient's  shows they are overdue for Ra 37 and  files searched. HM updated with 2010 mammogram.     Mammogram ordered, not yet completed.

## 2022-12-21 PROBLEM — J02.9 SORE THROAT: Status: RESOLVED | Noted: 2022-11-21 | Resolved: 2022-12-21

## 2023-01-18 ENCOUNTER — OFFICE VISIT (OUTPATIENT)
Dept: FAMILY MEDICINE CLINIC | Age: 53
End: 2023-01-18
Payer: COMMERCIAL

## 2023-01-18 VITALS
DIASTOLIC BLOOD PRESSURE: 76 MMHG | SYSTOLIC BLOOD PRESSURE: 110 MMHG | BODY MASS INDEX: 29.84 KG/M2 | OXYGEN SATURATION: 99 % | HEART RATE: 63 BPM | WEIGHT: 168.4 LBS | HEIGHT: 63 IN

## 2023-01-18 DIAGNOSIS — G43.009 MIGRAINE WITHOUT AURA AND WITHOUT STATUS MIGRAINOSUS, NOT INTRACTABLE: Primary | ICD-10-CM

## 2023-01-18 PROCEDURE — G8427 DOCREV CUR MEDS BY ELIG CLIN: HCPCS | Performed by: FAMILY MEDICINE

## 2023-01-18 PROCEDURE — G8417 CALC BMI ABV UP PARAM F/U: HCPCS | Performed by: FAMILY MEDICINE

## 2023-01-18 PROCEDURE — 3017F COLORECTAL CA SCREEN DOC REV: CPT | Performed by: FAMILY MEDICINE

## 2023-01-18 PROCEDURE — 1036F TOBACCO NON-USER: CPT | Performed by: FAMILY MEDICINE

## 2023-01-18 PROCEDURE — 99213 OFFICE O/P EST LOW 20 MIN: CPT | Performed by: FAMILY MEDICINE

## 2023-01-18 PROCEDURE — G8484 FLU IMMUNIZE NO ADMIN: HCPCS | Performed by: FAMILY MEDICINE

## 2023-01-18 NOTE — LETTER
[address] [address]  Phone: [phone]  Fax: [phone]    Dionna Rolon MD        January 18, 2023     Patient: Reinaldo Sosa   YOB: 1970   Date of Visit: 1/18/2023       To Whom It May Concern: It is my medical opinion that Rafaela Cerda should be excused from work from 12/16 to 12/18 due to migraine. .    If you have any questions or concerns, please don't hesitate to call.     Sincerely,        Dionna Rolon MD

## 2023-01-18 NOTE — PROGRESS NOTES
Tawnya Valentin (:  1970) is a 45 y.o. female,Established patient, here for evaluation of the following chief complaint(s):  Follow-Up from Hospital (Patient was at [facility] ER on 23 for migraine. Some improvement. Treating with excedrin. )         ASSESSMENT/PLAN:  Amarilis was seen today for follow-up from hospital.    Diagnoses and all orders for this visit:    Migraine without aura and without status migrainosus, not intractable    Other orders  -     Rimegepant Sulfate 75 MG TBDP; Take 75 mg by mouth daily as needed (migraine)  Not well controlled  Failed imitrex  Will try nurtec     No follow-ups on file. Subjective   SUBJECTIVE/OBJECTIVE:  HPI  Pt is a of 45 y.o. female comes in today with   Chief Complaint   Patient presents with    Follow-Up from Hospital     Patient was at [facility] ER on 23 for migraine. Some improvement. Treating with excedrin. Went to er with severe migraine pain. Didn't respond to oct  Still has some headache  Drainage since . Vitals:    23 1255   BP: 110/76   Site: Left Upper Arm   Position: Sitting   Cuff Size: Medium Adult   Pulse: 63   SpO2: 99%   Weight: 168 lb 6.4 oz (76.4 kg)   Height: 5' 3\" (1.6 m)      Review of Systems       Objective   Physical Exam         An electronic signature was used to authenticate this note.     --Roman Andrew MD

## 2023-05-11 SDOH — ECONOMIC STABILITY: TRANSPORTATION INSECURITY
IN THE PAST 12 MONTHS, HAS LACK OF TRANSPORTATION KEPT YOU FROM MEETINGS, WORK, OR FROM GETTING THINGS NEEDED FOR DAILY LIVING?: NO

## 2023-05-11 SDOH — ECONOMIC STABILITY: INCOME INSECURITY: HOW HARD IS IT FOR YOU TO PAY FOR THE VERY BASICS LIKE FOOD, HOUSING, MEDICAL CARE, AND HEATING?: PATIENT DECLINED

## 2023-05-11 SDOH — ECONOMIC STABILITY: FOOD INSECURITY: WITHIN THE PAST 12 MONTHS, YOU WORRIED THAT YOUR FOOD WOULD RUN OUT BEFORE YOU GOT MONEY TO BUY MORE.: PATIENT DECLINED

## 2023-05-11 SDOH — ECONOMIC STABILITY: FOOD INSECURITY: WITHIN THE PAST 12 MONTHS, THE FOOD YOU BOUGHT JUST DIDN'T LAST AND YOU DIDN'T HAVE MONEY TO GET MORE.: PATIENT DECLINED

## 2023-05-11 SDOH — ECONOMIC STABILITY: HOUSING INSECURITY
IN THE LAST 12 MONTHS, WAS THERE A TIME WHEN YOU DID NOT HAVE A STEADY PLACE TO SLEEP OR SLEPT IN A SHELTER (INCLUDING NOW)?: NO

## 2023-05-12 ENCOUNTER — OFFICE VISIT (OUTPATIENT)
Dept: FAMILY MEDICINE CLINIC | Age: 53
End: 2023-05-12
Payer: COMMERCIAL

## 2023-05-12 VITALS
SYSTOLIC BLOOD PRESSURE: 124 MMHG | DIASTOLIC BLOOD PRESSURE: 80 MMHG | WEIGHT: 169 LBS | BODY MASS INDEX: 29.95 KG/M2 | HEIGHT: 63 IN | HEART RATE: 68 BPM | TEMPERATURE: 97.9 F | OXYGEN SATURATION: 99 %

## 2023-05-12 DIAGNOSIS — R06.2 WHEEZING: ICD-10-CM

## 2023-05-12 DIAGNOSIS — R05.1 ACUTE COUGH: Primary | ICD-10-CM

## 2023-05-12 LAB
INFLUENZA A ANTIGEN, POC: NEGATIVE
INFLUENZA B ANTIGEN, POC: NORMAL

## 2023-05-12 PROCEDURE — 1036F TOBACCO NON-USER: CPT | Performed by: FAMILY MEDICINE

## 2023-05-12 PROCEDURE — 87804 INFLUENZA ASSAY W/OPTIC: CPT | Performed by: FAMILY MEDICINE

## 2023-05-12 PROCEDURE — G8427 DOCREV CUR MEDS BY ELIG CLIN: HCPCS | Performed by: FAMILY MEDICINE

## 2023-05-12 PROCEDURE — 99213 OFFICE O/P EST LOW 20 MIN: CPT | Performed by: FAMILY MEDICINE

## 2023-05-12 PROCEDURE — G8417 CALC BMI ABV UP PARAM F/U: HCPCS | Performed by: FAMILY MEDICINE

## 2023-05-12 PROCEDURE — 3017F COLORECTAL CA SCREEN DOC REV: CPT | Performed by: FAMILY MEDICINE

## 2023-05-12 RX ORDER — ALBUTEROL SULFATE 90 UG/1
2 AEROSOL, METERED RESPIRATORY (INHALATION) EVERY 6 HOURS PRN
Qty: 18 G | Refills: 3 | Status: SHIPPED | OUTPATIENT
Start: 2023-05-12

## 2023-05-12 SDOH — ECONOMIC STABILITY: FOOD INSECURITY: WITHIN THE PAST 12 MONTHS, YOU WORRIED THAT YOUR FOOD WOULD RUN OUT BEFORE YOU GOT MONEY TO BUY MORE.: NEVER TRUE

## 2023-05-12 SDOH — ECONOMIC STABILITY: INCOME INSECURITY: HOW HARD IS IT FOR YOU TO PAY FOR THE VERY BASICS LIKE FOOD, HOUSING, MEDICAL CARE, AND HEATING?: NOT HARD AT ALL

## 2023-05-12 SDOH — ECONOMIC STABILITY: FOOD INSECURITY: WITHIN THE PAST 12 MONTHS, THE FOOD YOU BOUGHT JUST DIDN'T LAST AND YOU DIDN'T HAVE MONEY TO GET MORE.: NEVER TRUE

## 2023-05-12 ASSESSMENT — PATIENT HEALTH QUESTIONNAIRE - PHQ9
SUM OF ALL RESPONSES TO PHQ9 QUESTIONS 1 & 2: 0
SUM OF ALL RESPONSES TO PHQ QUESTIONS 1-9: 0
SUM OF ALL RESPONSES TO PHQ QUESTIONS 1-9: 0
1. LITTLE INTEREST OR PLEASURE IN DOING THINGS: 0
2. FEELING DOWN, DEPRESSED OR HOPELESS: 0
SUM OF ALL RESPONSES TO PHQ QUESTIONS 1-9: 0
SUM OF ALL RESPONSES TO PHQ QUESTIONS 1-9: 0

## 2023-05-24 ENCOUNTER — OFFICE VISIT (OUTPATIENT)
Dept: FAMILY MEDICINE CLINIC | Age: 53
End: 2023-05-24
Payer: COMMERCIAL

## 2023-05-24 VITALS
HEIGHT: 63 IN | DIASTOLIC BLOOD PRESSURE: 74 MMHG | HEART RATE: 74 BPM | BODY MASS INDEX: 29.77 KG/M2 | SYSTOLIC BLOOD PRESSURE: 118 MMHG | OXYGEN SATURATION: 98 % | WEIGHT: 168 LBS

## 2023-05-24 DIAGNOSIS — M25.461 EFFUSION OF RIGHT KNEE: ICD-10-CM

## 2023-05-24 DIAGNOSIS — M79.89 LEG SWELLING: ICD-10-CM

## 2023-05-24 DIAGNOSIS — T14.8XXA BRUISING: Primary | ICD-10-CM

## 2023-05-24 PROCEDURE — 1036F TOBACCO NON-USER: CPT | Performed by: FAMILY MEDICINE

## 2023-05-24 PROCEDURE — 3017F COLORECTAL CA SCREEN DOC REV: CPT | Performed by: FAMILY MEDICINE

## 2023-05-24 PROCEDURE — 99213 OFFICE O/P EST LOW 20 MIN: CPT | Performed by: FAMILY MEDICINE

## 2023-05-24 PROCEDURE — G8417 CALC BMI ABV UP PARAM F/U: HCPCS | Performed by: FAMILY MEDICINE

## 2023-05-24 PROCEDURE — G8427 DOCREV CUR MEDS BY ELIG CLIN: HCPCS | Performed by: FAMILY MEDICINE

## 2023-05-25 ENCOUNTER — TELEPHONE (OUTPATIENT)
Dept: FAMILY MEDICINE CLINIC | Age: 53
End: 2023-05-25

## 2023-05-26 LAB
A/G RATIO: 1.7 (ref 1.2–2.2)
ALBUMIN SERPL-MCNC: 4.5 G/DL (ref 3.8–4.9)
ALP BLD-CCNC: 115 IU/L (ref 44–121)
ALT SERPL-CCNC: 10 IU/L (ref 0–32)
AST SERPL-CCNC: 17 IU/L (ref 0–40)
BASOPHILS ABSOLUTE: 0 X10E3/UL (ref 0–0.2)
BASOPHILS RELATIVE PERCENT: 1 %
BILIRUB SERPL-MCNC: 0.3 MG/DL (ref 0–1.2)
BUN / CREAT RATIO: 11 (ref 9–23)
BUN BLDV-MCNC: 11 MG/DL (ref 6–24)
CALCIUM SERPL-MCNC: 9.4 MG/DL (ref 8.7–10.2)
CHLORIDE BLD-SCNC: 110 MMOL/L (ref 96–106)
CO2: 22 MMOL/L (ref 20–29)
CREAT SERPL-MCNC: 1.01 MG/DL (ref 0.57–1)
D DIMER: 0.39 MG/L FEU (ref 0–0.49)
EOSINOPHILS ABSOLUTE: 0.2 X10E3/UL (ref 0–0.4)
EOSINOPHILS RELATIVE PERCENT: 4 %
ERYTHROCYTES, NUCLEATED/100 LEU: NORMAL
ESTIMATED GLOMERULAR FILTRATION RATE CREATININE EQUATION: 66 ML/MIN/1.73
GLOBULIN: 2.7 G/DL (ref 1.5–4.5)
GLUCOSE BLD-MCNC: 91 MG/DL (ref 70–99)
HCT VFR BLD CALC: 37.6 % (ref 34–46.6)
HEMOGLOBIN: 12.9 G/DL (ref 11.1–15.9)
IMMATURE CELLS ABSOLUTE COUNT: NORMAL
IMMATURE GRANS (ABS): 0 X10E3/UL (ref 0–0.1)
IMMATURE GRANULOCYTES: 0 %
LYMPHOCYTES ABSOLUTE: 1.6 X10E3/UL (ref 0.7–3.1)
LYMPHOCYTES RELATIVE PERCENT: 30 %
MCH RBC QN AUTO: 28.3 PG (ref 26.6–33)
MCHC RBC AUTO-ENTMCNC: 34.3 G/DL (ref 31.5–35.7)
MCV RBC AUTO: 83 FL (ref 79–97)
MONOCYTES ABSOLUTE: 0.4 X10E3/UL (ref 0.1–0.9)
MONOCYTES RELATIVE PERCENT: 7 %
MORPHOLOGY: NORMAL
NEUTROPHILS ABSOLUTE: 3.1 X10E3/UL (ref 1.4–7)
PDW BLD-RTO: 14.3 % (ref 11.7–15.4)
PLATELET # BLD: 225 X10E3/UL (ref 150–450)
POTASSIUM SERPL-SCNC: 4 MMOL/L (ref 3.5–5.2)
RBC # BLD: 4.56 X10E6/UL (ref 3.77–5.28)
SEGMENTED NEUTROPHILS RELATIVE PERCENT: 58 %
SODIUM BLD-SCNC: 145 MMOL/L (ref 134–144)
TOTAL PROTEIN: 7.2 G/DL (ref 6–8.5)
WBC # BLD: 5.3 X10E3/UL (ref 3.4–10.8)

## 2023-05-30 ENCOUNTER — TELEPHONE (OUTPATIENT)
Dept: FAMILY MEDICINE CLINIC | Age: 53
End: 2023-05-30

## 2023-05-30 NOTE — TELEPHONE ENCOUNTER
Confirmation finally received. Radha informed. If she does not receive it in the next 30 minutes she will call back. Letter scanned to chart with copy of confirmation though.

## 2023-05-30 NOTE — TELEPHONE ENCOUNTER
Radha informed, new letter requested reflecting these dates, fax to [phone] once signed by PCP.

## 2023-05-30 NOTE — TELEPHONE ENCOUNTER
Vivienne Zaman is calling back to follow up from speaking to [place]. She states she has not received the letter that was supposed to be faxed over. I confirmed the fax number and she would like a call back once this has been sent.

## 2023-06-01 NOTE — TELEPHONE ENCOUNTER
Radha from [employer] called stating RTW letter was never received. I re-faxed letter and scanned letter and success page into pt's chart.

## 2023-10-16 ENCOUNTER — HOSPITAL ENCOUNTER (OUTPATIENT)
Dept: MAMMOGRAPHY | Age: 53
Discharge: HOME | End: 2023-10-16
Payer: COMMERCIAL

## 2023-10-16 VITALS — HEIGHT: 63 IN | WEIGHT: 168 LBS | BODY MASS INDEX: 29.77 KG/M2

## 2023-10-16 DIAGNOSIS — Z12.31 VISIT FOR SCREENING MAMMOGRAM: ICD-10-CM

## 2023-10-16 PROCEDURE — 77063 BREAST TOMOSYNTHESIS BI: CPT

## 2023-10-26 ENCOUNTER — PATIENT MESSAGE (OUTPATIENT)
Dept: FAMILY MEDICINE CLINIC | Age: 53
End: 2023-10-26

## 2023-10-26 DIAGNOSIS — R05.1 ACUTE COUGH: ICD-10-CM

## 2023-10-26 DIAGNOSIS — R06.2 WHEEZING: ICD-10-CM

## 2023-10-27 RX ORDER — ALBUTEROL SULFATE 90 UG/1
2 AEROSOL, METERED RESPIRATORY (INHALATION) EVERY 6 HOURS PRN
Qty: 18 G | Refills: 3 | Status: SHIPPED | OUTPATIENT
Start: 2023-10-27

## 2023-10-29 DIAGNOSIS — R05.1 ACUTE COUGH: ICD-10-CM

## 2023-10-29 DIAGNOSIS — R06.2 WHEEZING: ICD-10-CM

## 2023-10-30 NOTE — TELEPHONE ENCOUNTER
Medication:   Requested Prescriptions     Pending Prescriptions Disp Refills    albuterol sulfate HFA (PROAIR HFA) 108 (90 Base) MCG/ACT inhaler 18 g 3     Sig: Inhale 2 puffs into the lungs every 6 hours as needed for Wheezing        Last Filled:  10/27/2023    Patient Phone Number: [phone] (home)     Last appt: 5/24/2023   Next appt: Visit date not found    Last OARRS:        No data to display

## 2023-10-31 RX ORDER — ALBUTEROL SULFATE 90 UG/1
2 AEROSOL, METERED RESPIRATORY (INHALATION) EVERY 6 HOURS PRN
Qty: 18 G | Refills: 3 | Status: SHIPPED | OUTPATIENT
Start: 2023-10-31

## 2024-04-13 ENCOUNTER — APPOINTMENT (OUTPATIENT)
Dept: GENERAL RADIOLOGY | Age: 54
End: 2024-04-13
Payer: COMMERCIAL

## 2024-04-13 ENCOUNTER — HOSPITAL ENCOUNTER (EMERGENCY)
Age: 54
Discharge: HOME | End: 2024-04-13
Attending: EMERGENCY MEDICINE
Payer: COMMERCIAL

## 2024-04-13 VITALS
DIASTOLIC BLOOD PRESSURE: 75 MMHG | HEART RATE: 85 BPM | TEMPERATURE: 97.5 F | SYSTOLIC BLOOD PRESSURE: 116 MMHG | HEIGHT: 63 IN | WEIGHT: 164.02 LBS | OXYGEN SATURATION: 97 % | BODY MASS INDEX: 29.06 KG/M2 | RESPIRATION RATE: 16 BRPM

## 2024-04-13 DIAGNOSIS — M25.562 ACUTE PAIN OF LEFT KNEE: Primary | ICD-10-CM
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
## 2024-04-13 PROCEDURE — 73560 X-RAY EXAM OF KNEE 1 OR 2: CPT

## 2024-04-13 PROCEDURE — 6360000002 HC RX W HCPCS: Performed by: EMERGENCY MEDICINE

## 2024-04-13 PROCEDURE — 96372 THER/PROPH/DIAG INJ SC/IM: CPT

## 2024-04-13 PROCEDURE — 6370000000 HC RX 637 (ALT 250 FOR IP): Performed by: EMERGENCY MEDICINE

## 2024-04-13 PROCEDURE — 99284 EMERGENCY DEPT VISIT MOD MDM: CPT

## 2024-04-13 RX ORDER — KETOROLAC TROMETHAMINE 15 MG/ML
15 INJECTION, SOLUTION INTRAMUSCULAR; INTRAVENOUS ONCE
Status: COMPLETED | OUTPATIENT
Start: 2024-04-13 | End: 2024-04-13

## 2024-04-13 RX ORDER — ACETAMINOPHEN 325 MG/1
650 TABLET ORAL ONCE
Status: COMPLETED | OUTPATIENT
Start: 2024-04-13 | End: 2024-04-13

## 2024-04-13 RX ADMIN — ACETAMINOPHEN 325MG 650 MG: 325 TABLET ORAL at 19:35

## 2024-04-13 RX ADMIN — KETOROLAC TROMETHAMINE 15 MG: 15 INJECTION, SOLUTION INTRAMUSCULAR; INTRAVENOUS at 19:36

## 2024-04-13 ASSESSMENT — PAIN SCALES - GENERAL
PAINLEVEL_OUTOF10: 8
PAINLEVEL_OUTOF10: 8
PAINLEVEL_OUTOF10: 6
PAINLEVEL_OUTOF10: 8

## 2024-04-13 ASSESSMENT — PAIN DESCRIPTION - DESCRIPTORS: DESCRIPTORS: ACHING

## 2024-04-13 ASSESSMENT — LIFESTYLE VARIABLES
HOW MANY STANDARD DRINKS CONTAINING ALCOHOL DO YOU HAVE ON A TYPICAL DAY: PATIENT DOES NOT DRINK
HOW OFTEN DO YOU HAVE A DRINK CONTAINING ALCOHOL: NEVER

## 2024-04-13 ASSESSMENT — PAIN DESCRIPTION - ORIENTATION: ORIENTATION: LEFT

## 2024-04-13 ASSESSMENT — PAIN - FUNCTIONAL ASSESSMENT
PAIN_FUNCTIONAL_ASSESSMENT: 0-10
PAIN_FUNCTIONAL_ASSESSMENT: 0-10

## 2024-04-13 ASSESSMENT — PAIN DESCRIPTION - LOCATION: LOCATION: KNEE

## 2024-04-13 NOTE — ED PROVIDER NOTES
[facility] EMERGENCY DEPARTMENT  EMERGENCY DEPARTMENT ENCOUNTER        Pt Name: Amarilis Myers  MRN: [id number]  Birthdate 1970  Date of evaluation: 2024  Provider: Socorro Jenkins MD  PCP: Jeison Laird MD  Note Started: 7:20 PM EDT 24    CHIEF COMPLAINT     Knee pain  HISTORY OF PRESENT ILLNESS: 1 or more Elements     Chief Complaint   Patient presents with    Knee Pain     Pt presents with c/o left knee pain. Pt is training for a marathon and states pain has been worsening over the past two weeks. Pt states she ran 20 miles today and now she can't bend her knee.      History from : Patient and Family mom at bedside as well  Limitations to history : None    Amarilis Myers is a 53 y.o. female who presents to the emergency department secondary to concern for left knee pain.  She states 2 weeks ago she ran 18 miles and she started to have some knee discomfort then.  However she is training for the [employer] pig and she continued to run.  Today was her longest day of running at 20 miles.  She states by the end of it she was having difficulty walking.  She states that since she finished it she has had increased pain with bending her knee.  No numbness no tingling.  She rubbed some cream on it but otherwise did not take any medication for pain.  She has not previously injured this knee or required any surgery on it.  Pain is worse with trying to bend it or trying to walk on it.    Past medical history noted below.  Denies smoking.  Aside from what is stated above denies any other symptoms or modifying factors.    Nursing Notes were all reviewed and agreed with or any disagreements addressed in HPI/MDM.  REVIEW OF SYSTEMS :    Review of Systems Pertinent positive and negative findings as documented in the HPI  PAST MEDICAL HISTORY     Past Medical History:   Diagnosis Date    Headache        SURGICALHISTORY       Past Surgical History:   Procedure Laterality Date

## 2024-04-18 SDOH — HEALTH STABILITY: PHYSICAL HEALTH: ON AVERAGE, HOW MANY DAYS PER WEEK DO YOU ENGAGE IN MODERATE TO STRENUOUS EXERCISE (LIKE A BRISK WALK)?: 5 DAYS

## 2024-04-18 SDOH — HEALTH STABILITY: PHYSICAL HEALTH: ON AVERAGE, HOW MANY MINUTES DO YOU ENGAGE IN EXERCISE AT THIS LEVEL?: 60 MIN

## 2024-04-19 ENCOUNTER — OFFICE VISIT (OUTPATIENT)
Dept: ORTHOPEDIC SURGERY | Age: 54
End: 2024-04-19

## 2024-04-19 VITALS — WEIGHT: 164 LBS | BODY MASS INDEX: 29.06 KG/M2 | HEIGHT: 63 IN

## 2024-04-19 DIAGNOSIS — M76.32 ILIOTIBIAL BAND SYNDROME OF LEFT SIDE: Primary | ICD-10-CM

## 2024-04-19 RX ORDER — NAPROXEN 500 MG/1
500 TABLET ORAL 2 TIMES DAILY WITH MEALS
Qty: 60 TABLET | Refills: 0 | Status: SHIPPED | OUTPATIENT
Start: 2024-04-19 | End: 2024-05-19

## 2024-04-19 NOTE — PROGRESS NOTES
CHIEF COMPLAINT: Left knee pain/IT band syndrome    HISTORY:  Ms. Myers 53 y.o.  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  She is complaining of achy pain.  Pain is increase with standing and walking and decrease with rest. She is having difficulty bending her leg d/t pain. Alleviating factors: rest. No radiation and no numbness and tingling sensation. No other complaint.  No h/o trauma or gout.  She initially went to [facility] where she was x-rayed and evaluated and referred to orthopedics.    Past Medical History:   Diagnosis Date    Headache        Past Surgical History:   Procedure Laterality Date     SECTION      x 2    CHOLECYSTECTOMY      HYSTERECTOMY (CERVIX STATUS UNKNOWN)         Social History     Socioeconomic History    Marital status: Single     Spouse name: Not on file    Number of children: Not on file    Years of education: Not on file    Highest education level: Not on file   Occupational History    Not on file   Tobacco Use    Smoking status: Never    Smokeless tobacco: Never   Substance and Sexual Activity    Alcohol use: No    Drug use: No    Sexual activity: Not Currently     Partners: Male   Other Topics Concern    Not on file   Social History Narrative    Not on file     Social Determinants of Health     Financial Resource Strain: Low Risk  (2023)    Overall Financial Resource Strain (CARDIA)     Difficulty of Paying Living Expenses: Not hard at all   Food Insecurity: Not on file (2023)   Transportation Needs: Unknown (2023)    PRAPARE - Transportation     Lack of Transportation (Medical): Not on file     Lack of Transportation (Non-Medical): No   Physical Activity: Sufficiently Active (2024)    Exercise Vital Sign     Days of Exercise per Week: 5 days     Minutes of Exercise per Session: 60 min   Stress: Not on file

## 2024-04-20 PROBLEM — M76.32 ILIOTIBIAL BAND SYNDROME OF LEFT SIDE: Status: ACTIVE | Noted: 2024-04-20

## 2024-04-30 NOTE — PLAN OF CARE
[facility]- Outpatient Rehabilitation and Therapy [address], [place] [address] office: [phone] fax: [phone]     Physical Therapy Initial Evaluation Certification      Dear Vance Alexander MD,    We had the pleasure of evaluating the following patient for physical therapy services at [facility].  A summary of our findings can be found in the initial assessment below.  This includes our plan of care.  If you have any questions or concerns regarding these findings, please do not hesitate to contact me at the office phone number listed above.  Thank you for the referral.     Physician Signature:_______________________________Date:__________________  By signing above (or electronic signature), therapist’s plan is approved by physician       Physical Therapy: TREATMENT/PROGRESS NOTE   Patient: Amarilis Myers (53 y.o. female)   Examination Date: 2024   :  1970 MRN: [id number]   Visit #: 1   Insurance Allowable Auth Needed   NO DED / $45 COPAY / NO AUTH 60 pcy  []Yes    [x]No    Insurance: Payor: [employer] / Plan: [employer] - CHOICE PLU / Product Type: *No Product type* /   Insurance ID: [id number] - (Commercial)  Secondary Insurance (if applicable):    Treatment Diagnosis:     ICD-10-CM    1. Weakness of left lower extremity  R29.898       2. Weakness of left hip  R29.898          Medical Diagnosis:  Iliotibial band syndrome of left side [M76.32]   Referring Physician: Vance Alexander MD  PCP: Jeison Laird MD     Plan of care signed (Y/N):     Date of Patient follow up with Physician:      Progress Report/POC: EVAL today  POC update due: (10 visits /OR AUTH LIMITS, whichever is less)  2024                                             Precautions/ Contra-indications:           Latex allergy:  NO  Pacemaker:    NO  Contraindications for Manipulation: None  Date of Surgery: n/a  Other:    Red

## 2024-05-01 ENCOUNTER — HOSPITAL ENCOUNTER (OUTPATIENT)
Dept: PHYSICAL THERAPY | Age: 54
Setting detail: THERAPIES SERIES
Discharge: HOME | End: 2024-05-01
Attending: ORTHOPAEDIC SURGERY
Payer: COMMERCIAL

## 2024-05-01 DIAGNOSIS — R29.898 WEAKNESS OF LEFT HIP: ICD-10-CM

## 2024-05-01 DIAGNOSIS — R29.898 WEAKNESS OF LEFT LOWER EXTREMITY: Primary | ICD-10-CM

## 2024-05-01 PROCEDURE — 97530 THERAPEUTIC ACTIVITIES: CPT

## 2024-05-01 PROCEDURE — 97110 THERAPEUTIC EXERCISES: CPT

## 2024-05-01 PROCEDURE — 97161 PT EVAL LOW COMPLEX 20 MIN: CPT

## 2024-05-03 ENCOUNTER — HOSPITAL ENCOUNTER (OUTPATIENT)
Dept: PHYSICAL THERAPY | Age: 54
Setting detail: THERAPIES SERIES
Discharge: HOME | End: 2024-05-03
Attending: ORTHOPAEDIC SURGERY
Payer: COMMERCIAL

## 2024-05-03 PROCEDURE — 97140 MANUAL THERAPY 1/> REGIONS: CPT

## 2024-05-03 PROCEDURE — 97110 THERAPEUTIC EXERCISES: CPT

## 2024-05-03 PROCEDURE — 97035 APP MDLTY 1+ULTRASOUND EA 15: CPT

## 2024-05-03 NOTE — FLOWSHEET NOTE
[facility]- Outpatient Rehabilitation and Therapy [address], [place] [address] office: [phone] fax: [phone]     Physical Therapy Initial Evaluation Certification      Dear Vance Alexander MD,    We had the pleasure of evaluating the following patient for physical therapy services at [facility].  A summary of our findings can be found in the initial assessment below.  This includes our plan of care.  If you have any questions or concerns regarding these findings, please do not hesitate to contact me at the office phone number listed above.  Thank you for the referral.     Physician Signature:_______________________________Date:__________________  By signing above (or electronic signature), therapist’s plan is approved by physician       Physical Therapy: TREATMENT/PROGRESS NOTE   Patient: Amarilis Myers (53 y.o. female)   Examination Date: 2024   :  1970 MRN: [id number]   Visit #: 2   Insurance Allowable Auth Needed   NO DED / $45 COPAY / NO AUTH 60 pcy  []Yes    [x]No    Insurance: Payor: [employer] HE[employer] / Plan: [employer] - CHOICE PLU / Product Type: *No Product type* /   Insurance ID: [id number] - (Commercial)  Secondary Insurance (if applicable):    Treatment Diagnosis:   No diagnosis found.     Medical Diagnosis:  Iliotibial band syndrome of left side [M76.32]   Referring Physician: Vance Alexander MD  PCP: Jeison Laird MD     Plan of care signed (Y/N):     Date of Patient follow up with Physician:      Progress Report/POC: EVAL today  POC update due: (10 visits /OR AUTH LIMITS, whichever is less)  2024                                             Precautions/ Contra-indications:           Latex allergy:  NO  Pacemaker:    NO  Contraindications for Manipulation: None  Date of Surgery: n/a  Other:    Red Flags:  None    C-SSRS Triggered by Intake questionnaire:   Patient answered 'NO' to both behavioral

## 2024-05-14 ENCOUNTER — APPOINTMENT (OUTPATIENT)
Dept: PHYSICAL THERAPY | Age: 54
End: 2024-05-14
Attending: ORTHOPAEDIC SURGERY
Payer: COMMERCIAL

## 2024-05-15 ENCOUNTER — TELEPHONE (OUTPATIENT)
Dept: ORTHOPEDIC SURGERY | Age: 54
End: 2024-05-15

## 2024-05-15 ENCOUNTER — OFFICE VISIT (OUTPATIENT)
Dept: ORTHOPEDIC SURGERY | Age: 54
End: 2024-05-15

## 2024-05-15 VITALS — HEIGHT: 63 IN | WEIGHT: 164 LBS | BODY MASS INDEX: 29.06 KG/M2

## 2024-05-15 DIAGNOSIS — M25.562 LEFT LATERAL KNEE PAIN: ICD-10-CM

## 2024-05-15 DIAGNOSIS — M76.32 ILIOTIBIAL BAND SYNDROME OF LEFT SIDE: Primary | ICD-10-CM

## 2024-05-15 NOTE — PROGRESS NOTES
CHIEF COMPLAINT: Left knee pain/ IT band syndrome    HISTORY:  Ms. Myers 53 y.o.  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  She is complaining of achy pain.  Pain is increase with standing and walking and decrease with rest. She is having difficulty bending her leg d/t pain. Her knee is now sticking on her with difficulty straightening it after sitting for long time.  Alleviating factors: rest. No radiation and no numbness and tingling sensation.  She has started PT and completed 2 weeks with no improvement. No other complaint.  No h/o trauma or gout.  She initially went to [facility] where she was x-rayed and evaluated and referred to orthopedics.    Past Medical History:   Diagnosis Date    Headache        Past Surgical History:   Procedure Laterality Date     SECTION      x 2    CHOLECYSTECTOMY      HYSTERECTOMY (CERVIX STATUS UNKNOWN)         Social History     Socioeconomic History    Marital status: Single     Spouse name: Not on file    Number of children: Not on file    Years of education: Not on file    Highest education level: Not on file   Occupational History    Not on file   Tobacco Use    Smoking status: Never    Smokeless tobacco: Never   Substance and Sexual Activity    Alcohol use: No    Drug use: No    Sexual activity: Not Currently     Partners: Male   Other Topics Concern    Not on file   Social History Narrative    Not on file     Social Determinants of Health     Financial Resource Strain: Low Risk  (2023)    Overall Financial Resource Strain (CARDIA)     Difficulty of Paying Living Expenses: Not hard at all   Food Insecurity: Not on file (2023)   Transportation Needs: Unknown (2023)    PRAPARE - Transportation     Lack of Transportation (Medical): Not on file     Lack of Transportation (Non-Medical): No   Physical Activity:

## 2024-05-15 NOTE — TELEPHONE ENCOUNTER
LVM for patient stating that Dr. Alexander needs to leave early for surgery.   Calling to see if she is able to move appointment to an earlier time today.    Upon return call please offer 2:30 with Kailey norton

## 2024-05-16 ENCOUNTER — APPOINTMENT (OUTPATIENT)
Dept: PHYSICAL THERAPY | Age: 54
End: 2024-05-16
Attending: ORTHOPAEDIC SURGERY
Payer: COMMERCIAL

## 2024-05-20 ENCOUNTER — APPOINTMENT (OUTPATIENT)
Dept: PHYSICAL THERAPY | Age: 54
End: 2024-05-20
Attending: ORTHOPAEDIC SURGERY
Payer: COMMERCIAL

## 2024-05-21 ENCOUNTER — TELEPHONE (OUTPATIENT)
Dept: ORTHOPEDIC SURGERY | Age: 54
End: 2024-05-21

## 2024-05-21 NOTE — TELEPHONE ENCOUNTER
SW patient regarding imaging MRI left knee approval and authorization being valid.  Patient was instructed that their imaging needs to be scheduled at [facility]. The patient was instructed to contact the facility to schedule  at:     [facility] [address] [place] [id number] P: [phone]     A follow up appointment will need to be scheduled to review the results and treatment plan.

## 2024-05-22 ENCOUNTER — HOSPITAL ENCOUNTER (OUTPATIENT)
Dept: MRI IMAGING | Age: 54
Discharge: HOME | End: 2024-05-22
Payer: COMMERCIAL

## 2024-05-22 DIAGNOSIS — M76.32 ILIOTIBIAL BAND SYNDROME OF LEFT SIDE: ICD-10-CM

## 2024-05-22 PROCEDURE — 73721 MRI JNT OF LWR EXTRE W/O DYE: CPT

## 2024-05-23 DIAGNOSIS — M76.32 ILIOTIBIAL BAND SYNDROME OF LEFT SIDE: ICD-10-CM

## 2024-05-23 RX ORDER — NAPROXEN 500 MG/1
500 TABLET ORAL 2 TIMES DAILY WITH MEALS
Qty: 60 TABLET | Refills: 0 | Status: SHIPPED | OUTPATIENT
Start: 2024-05-23

## 2024-05-23 NOTE — TELEPHONE ENCOUNTER
Patient last seen 5/15/2024 and medication last filled 4/19/2024:    Requested Prescriptions     Pending Prescriptions Disp Refills    naproxen (NAPROSYN) 500 MG tablet [Pharmacy Med Name: NAPROXEN 500MG TABLETS] 60 tablet 0     Sig: TAKE 1 TABLET BY MOUTH TWICE DAILY WITH MEALS

## 2024-05-29 ENCOUNTER — OFFICE VISIT (OUTPATIENT)
Dept: ORTHOPEDIC SURGERY | Age: 54
End: 2024-05-29
Payer: COMMERCIAL

## 2024-05-29 VITALS — BODY MASS INDEX: 29.06 KG/M2 | HEIGHT: 63 IN | WEIGHT: 164 LBS

## 2024-05-29 DIAGNOSIS — M76.32 ILIOTIBIAL BAND SYNDROME OF LEFT SIDE: Primary | ICD-10-CM

## 2024-05-29 DIAGNOSIS — M17.12 PATELLOFEMORAL ARTHRITIS OF LEFT KNEE: ICD-10-CM

## 2024-05-29 PROCEDURE — G8417 CALC BMI ABV UP PARAM F/U: HCPCS | Performed by: ORTHOPAEDIC SURGERY

## 2024-05-29 PROCEDURE — 1036F TOBACCO NON-USER: CPT | Performed by: ORTHOPAEDIC SURGERY

## 2024-05-29 PROCEDURE — G8427 DOCREV CUR MEDS BY ELIG CLIN: HCPCS | Performed by: ORTHOPAEDIC SURGERY

## 2024-05-29 PROCEDURE — 3017F COLORECTAL CA SCREEN DOC REV: CPT | Performed by: ORTHOPAEDIC SURGERY

## 2024-05-29 PROCEDURE — 99214 OFFICE O/P EST MOD 30 MIN: CPT | Performed by: ORTHOPAEDIC SURGERY

## 2024-05-29 RX ORDER — NAPROXEN 500 MG/1
500 TABLET ORAL 2 TIMES DAILY WITH MEALS
Qty: 60 TABLET | Refills: 0 | Status: SHIPPED | OUTPATIENT
Start: 2024-05-29 | End: 2024-06-28

## 2024-05-29 NOTE — PROGRESS NOTES
medial joint line, stable to varus and valgus stress.  She has intact sensation and good pedal pulses. She has good strength in 2 planes, and has mild tenderness on deep palpation over the medial joint line. Knee reflex 1+ bilaterally.  She is tender to palpation over the left IT band compared to the other side.    IMAGING:  Xray 3 views of the left knee was obtained at [facility] on 4/13/2024 and reviewed.  These demonstrate no significant degenerative changes.  No fracture.    MRI left knee dated 5/22/2024 was reviewed and showed;    1.  Findings compatible with strain of the distal iliotibial band and iliotibial  band syndrome.  2.  Small focus of edema/synovitis in the the inferior prefemoral fat which may  relate to impingement.  3.  Full-thickness chondral fissure in the anterior compartment.     IMPRESSION:    1-Left knee pain/ IT band syndrome  2-Patellofemoral arthritis of left knee.    PLAN: I discussed with the patient the MRI and the treatment options including both surgical and non-surgical treatment.  We recommended continue Quad exercises and stretching of the calf and hamstrings which was taught to the patient today.  She will take NSAIDS Naprosyn as needed, Rx sent instructed in care. I discussed with the patient that I think that she would really benefit from a course of physical therapy for further strengthening and stretching. An Rx for physical therapy was given to the patient. F/U in 2 months and we may consider cortisone injection if indicated.      Vance Alexander MD

## 2024-06-03 ENCOUNTER — OFFICE VISIT (OUTPATIENT)
Dept: FAMILY MEDICINE CLINIC | Age: 54
End: 2024-06-03
Payer: COMMERCIAL

## 2024-06-03 VITALS
WEIGHT: 167 LBS | SYSTOLIC BLOOD PRESSURE: 128 MMHG | OXYGEN SATURATION: 99 % | HEIGHT: 63 IN | DIASTOLIC BLOOD PRESSURE: 70 MMHG | BODY MASS INDEX: 29.59 KG/M2 | HEART RATE: 60 BPM

## 2024-06-03 DIAGNOSIS — M76.32 ILIOTIBIAL BAND SYNDROME OF LEFT SIDE: ICD-10-CM

## 2024-06-03 DIAGNOSIS — R05.1 ACUTE COUGH: Primary | ICD-10-CM

## 2024-06-03 DIAGNOSIS — G43.009 MIGRAINE WITHOUT AURA AND WITHOUT STATUS MIGRAINOSUS, NOT INTRACTABLE: ICD-10-CM

## 2024-06-03 PROCEDURE — G8427 DOCREV CUR MEDS BY ELIG CLIN: HCPCS | Performed by: FAMILY MEDICINE

## 2024-06-03 PROCEDURE — 3017F COLORECTAL CA SCREEN DOC REV: CPT | Performed by: FAMILY MEDICINE

## 2024-06-03 PROCEDURE — 1036F TOBACCO NON-USER: CPT | Performed by: FAMILY MEDICINE

## 2024-06-03 PROCEDURE — 99214 OFFICE O/P EST MOD 30 MIN: CPT | Performed by: FAMILY MEDICINE

## 2024-06-03 PROCEDURE — G8417 CALC BMI ABV UP PARAM F/U: HCPCS | Performed by: FAMILY MEDICINE

## 2024-06-03 RX ORDER — METFORMIN HYDROCHLORIDE 500 MG/1
500 TABLET, EXTENDED RELEASE ORAL
Qty: 30 TABLET | Refills: 5 | Status: SHIPPED | OUTPATIENT
Start: 2024-06-03

## 2024-06-03 RX ORDER — BENZONATATE 200 MG/1
200 CAPSULE ORAL 3 TIMES DAILY PRN
Qty: 30 CAPSULE | Refills: 0 | Status: SHIPPED | OUTPATIENT
Start: 2024-06-03

## 2024-06-03 SDOH — ECONOMIC STABILITY: FOOD INSECURITY: WITHIN THE PAST 12 MONTHS, THE FOOD YOU BOUGHT JUST DIDN'T LAST AND YOU DIDN'T HAVE MONEY TO GET MORE.: NEVER TRUE

## 2024-06-03 SDOH — ECONOMIC STABILITY: INCOME INSECURITY: HOW HARD IS IT FOR YOU TO PAY FOR THE VERY BASICS LIKE FOOD, HOUSING, MEDICAL CARE, AND HEATING?: NOT HARD AT ALL

## 2024-06-03 SDOH — ECONOMIC STABILITY: FOOD INSECURITY: WITHIN THE PAST 12 MONTHS, YOU WORRIED THAT YOUR FOOD WOULD RUN OUT BEFORE YOU GOT MONEY TO BUY MORE.: NEVER TRUE

## 2024-06-03 ASSESSMENT — PATIENT HEALTH QUESTIONNAIRE - PHQ9
SUM OF ALL RESPONSES TO PHQ QUESTIONS 1-9: 0
1. LITTLE INTEREST OR PLEASURE IN DOING THINGS: NOT AT ALL
SUM OF ALL RESPONSES TO PHQ QUESTIONS 1-9: 0
SUM OF ALL RESPONSES TO PHQ9 QUESTIONS 1 & 2: 0
2. FEELING DOWN, DEPRESSED OR HOPELESS: NOT AT ALL
SUM OF ALL RESPONSES TO PHQ QUESTIONS 1-9: 0
SUM OF ALL RESPONSES TO PHQ QUESTIONS 1-9: 0

## 2024-06-03 NOTE — PROGRESS NOTES
Amarilis Myers (:  1970) is a 53 y.o. female,Established patient, here for evaluation of the following chief complaint(s):  Knee Pain (Over a month) and Cough      Assessment & Plan   ASSESSMENT/PLAN:  Amarilis was seen today for knee pain and cough.    Diagnoses and all orders for this visit:    Acute cough  Reviewed diagnosis of bronchitis and differential diagnosis, including post infectious cough.  Prescription medications for symptom relief reviewed, as well as their possible side effects and adverse effects.  Supportive care including rest and otc treatments (honey, lemon, tea, humidified air) reviewed.  Call if worsening cough or chest pain, fever, chills, or myalgias develop.   Iliotibial band syndrome of left side  Not well controlled  PT as recommended and follow up with ortho as scheduled  Migraine without aura and without status migrainosus, not intractable  The current medical regimen is effective;  continue present plan and medications.   Other orders  -     benzonatate (TESSALON) 200 MG capsule; Take 1 capsule by mouth 3 times daily as needed for Cough  -     metFORMIN (GLUCOPHAGE-XR) 500 MG extended release tablet; Take 1 tablet by mouth daily (with breakfast)         No follow-ups on file.         Subjective   SUBJECTIVE/OBJECTIVE:  HPI  Pt is a of 53 y.o. 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 Saw ortho. Dx with IT band syndrome and PF arthritis  PT recommended    Vitals:    24 1538   BP: 128/70   Pulse: 60   SpO2: 99%   Weight: 75.8 kg (167 lb)   Height: 1.6 m (5' 3\")       Past Medical History:Reviewed  Medications:Reviewed.  No Known Allergies   Social hx:Reviewed.  Social History     Tobacco Use   Smoking Status Never   Smokeless Tobacco Never        Review of Systems       Objective

## 2024-07-22 ENCOUNTER — TELEPHONE (OUTPATIENT)
Dept: ORTHOPEDIC SURGERY | Age: 54
End: 2024-07-22

## 2024-09-29 ENCOUNTER — HOSPITAL ENCOUNTER (EMERGENCY)
Age: 54
Discharge: HOME | End: 2024-09-29
Payer: COMMERCIAL

## 2024-09-29 ENCOUNTER — APPOINTMENT (OUTPATIENT)
Dept: GENERAL RADIOLOGY | Age: 54
End: 2024-09-29
Payer: COMMERCIAL

## 2024-09-29 VITALS
RESPIRATION RATE: 16 BRPM | DIASTOLIC BLOOD PRESSURE: 54 MMHG | OXYGEN SATURATION: 99 % | TEMPERATURE: 98.1 F | HEART RATE: 61 BPM | SYSTOLIC BLOOD PRESSURE: 97 MMHG

## 2024-09-29 DIAGNOSIS — M25.522 LEFT ELBOW PAIN: Primary | ICD-10-CM

## 2024-09-29 PROCEDURE — 6370000000 HC RX 637 (ALT 250 FOR IP)

## 2024-09-29 PROCEDURE — 73080 X-RAY EXAM OF ELBOW: CPT

## 2024-09-29 PROCEDURE — 99283 EMERGENCY DEPT VISIT LOW MDM: CPT

## 2024-09-29 RX ORDER — ACETAMINOPHEN 500 MG
1000 TABLET ORAL ONCE
Status: COMPLETED | OUTPATIENT
Start: 2024-09-29 | End: 2024-09-29

## 2024-09-29 RX ADMIN — ACETAMINOPHEN 1000 MG: 500 TABLET ORAL at 10:21

## 2024-09-29 ASSESSMENT — PAIN DESCRIPTION - ORIENTATION
ORIENTATION: LEFT
ORIENTATION: LEFT

## 2024-09-29 ASSESSMENT — PAIN DESCRIPTION - LOCATION
LOCATION: ARM
LOCATION: ELBOW

## 2024-09-29 ASSESSMENT — PAIN SCALES - GENERAL
PAINLEVEL_OUTOF10: 5
PAINLEVEL_OUTOF10: 5

## 2024-09-29 ASSESSMENT — PAIN DESCRIPTION - DESCRIPTORS: DESCRIPTORS: ACHING;SHARP

## 2024-09-29 ASSESSMENT — PAIN - FUNCTIONAL ASSESSMENT
PAIN_FUNCTIONAL_ASSESSMENT: 0-10
PAIN_FUNCTIONAL_ASSESSMENT: NONE - DENIES PAIN

## 2024-09-29 ASSESSMENT — LIFESTYLE VARIABLES
HOW OFTEN DO YOU HAVE A DRINK CONTAINING ALCOHOL: NEVER
HOW MANY STANDARD DRINKS CONTAINING ALCOHOL DO YOU HAVE ON A TYPICAL DAY: PATIENT DOES NOT DRINK

## 2024-09-29 ASSESSMENT — PAIN DESCRIPTION - FREQUENCY: FREQUENCY: CONTINUOUS

## 2024-09-29 NOTE — ED PROVIDER NOTES
**ADVANCED PRACTICE PROVIDER, I HAVE EVALUATED THIS PATIENT**        [facility] EMERGENCY DEPARTMENT  EMERGENCY DEPARTMENT ENCOUNTER      Pt Name: Amarilis Myers  MRN:[id number]  Birthdate 1970  Date of evaluation: 2024  Provider: Jessica Roy PA-C  Note Started: 10:47 AM EDT 24        Chief Complaint:    Chief Complaint   Patient presents with    Arm Injury     P from stating 3 weeks ago she hit the elbow area of her left arm on a weight and it has gotten worse overtime and now spreads down to her fingers. Pain rated 5/10. No swelling or bruising noticed. Pt states her pinky finger sometimes gets numb/tingling         Nursing Notes, Past Medical Hx, Past Surgical Hx, Social Hx, Allergies, and Family Hx were all reviewed and agreed with or any disagreements were addressed in the HPI.    HPI: (Location, Duration, Timing, Severity, Quality, Assoc Sx, Context, Modifying factors)    History From: Patient          Chief Complaint of elbow injury    This is a  54 y.o. female who presents  to the ED with a concern of left elbow pain that happened 3 weeks ago while she was in the gym and hit the machinery with her elbow, said has intermittent tingling in pink and ring finger.  Denies redness, swelling, warmth, fevers  Does not have other concerns.    PastMedical/Surgical History:      Diagnosis Date    Headache          Procedure Laterality Date     SECTION      x 2    CHOLECYSTECTOMY      HYSTERECTOMY (CERVIX STATUS UNKNOWN)         Medications:  Discharge Medication List as of 2024 11:51 AM        CONTINUE these medications which have NOT CHANGED    Details   benzonatate (TESSALON) 200 MG capsule Take 1 capsule by mouth 3 times daily as needed for Cough, Disp-30 capsule, R-0Normal      metFORMIN (GLUCOPHAGE-XR) 500 MG extended release tablet Take 1 tablet by mouth daily (with breakfast), Disp-30 tablet, R-5Normal      naproxen (NAPROSYN) 500 MG tablet TAKE 1 TABLET

## 2024-10-07 ENCOUNTER — OFFICE VISIT (OUTPATIENT)
Dept: ORTHOPEDIC SURGERY | Age: 54
End: 2024-10-07
Payer: COMMERCIAL

## 2024-10-07 ENCOUNTER — TELEPHONE (OUTPATIENT)
Dept: ORTHOPEDIC SURGERY | Age: 54
End: 2024-10-07

## 2024-10-07 VITALS — WEIGHT: 167 LBS | HEIGHT: 63 IN | BODY MASS INDEX: 29.59 KG/M2

## 2024-10-07 DIAGNOSIS — R20.0 HAND NUMBNESS: Primary | ICD-10-CM

## 2024-10-07 PROCEDURE — G8427 DOCREV CUR MEDS BY ELIG CLIN: HCPCS | Performed by: ORTHOPAEDIC SURGERY

## 2024-10-07 PROCEDURE — 99204 OFFICE O/P NEW MOD 45 MIN: CPT | Performed by: ORTHOPAEDIC SURGERY

## 2024-10-07 PROCEDURE — G8484 FLU IMMUNIZE NO ADMIN: HCPCS | Performed by: ORTHOPAEDIC SURGERY

## 2024-10-07 PROCEDURE — G8417 CALC BMI ABV UP PARAM F/U: HCPCS | Performed by: ORTHOPAEDIC SURGERY

## 2024-10-07 SDOH — HEALTH STABILITY: PHYSICAL HEALTH: ON AVERAGE, HOW MANY DAYS PER WEEK DO YOU ENGAGE IN MODERATE TO STRENUOUS EXERCISE (LIKE A BRISK WALK)?: 6 DAYS

## 2024-10-07 SDOH — HEALTH STABILITY: PHYSICAL HEALTH: ON AVERAGE, HOW MANY MINUTES DO YOU ENGAGE IN EXERCISE AT THIS LEVEL?: 60 MIN

## 2024-10-07 NOTE — PROGRESS NOTES
Ms. Amarilis Myers is a 54 y.o. right handed woman  who is seen today in Hand Surgical Consultation at the request of Jeison Laird MD.'    She presents today regarding right symptoms which have been present for approximately 4 weeks.  A history of antecedent trauma or injury is Absent.  She reports symptoms to include moderate pain at the right Lateral elbow(s) with sensory symptoms to the Ring Finger and Small Finger .  Symptoms are worse with certain lifting or gripping activities.  Pain is worse with use.  She reports moderate pain exacerbation with wrist dorsiflexion. Symptoms show no change over time.      Previous treatment has included conservative measures.  She does not claim relation of her symptoms to her required work activities.  She has undergone any form of testing.    I have today reviewed with Amarilis Myers the clinically relevant, past medical history, medications, allergies,  family history, social history, and Review Of Systems & I have documented any details relevant to today's presenting complaints in my history above.  Ms. Amarilis Myers's self-reported past medical history, medications, allergies,  family history, social history, and Review Of Systems have been scanned into the chart under the \"Media\" tab.    Physical Exam:  Ms. Amarilis Myers's most recent vitals:  Vitals  Height: 160 cm (5' 3\")  Weight - Scale: 75.8 kg (167 lb)    She is well nourished, oriented to person, place & time.  She demonstrates appropriate mood and affect as well as normal gait and station.    Skin: Normal in appearance, Normal Color, and Free of Lesions Bilaterally   Digital range of motion is without significant limitation bilaterally.  Wrist range of motion is without significant limitation bilaterally.  Elbow range of motion is equal bilaterally   There is no evidence of gross joint instability bilaterally.  Sensation is objectively tingling in the ulnar innervated digits on the Left, normal on

## 2024-10-07 NOTE — PATIENT INSTRUCTIONS
Thank you for choosing [facility] for your Hand and Upper Extremity needs.  If we can be of any further assistance to you, please do not hesitate to contact us.    Office Phone Number:  [phone]  or  [phone]

## 2024-10-07 NOTE — TELEPHONE ENCOUNTER
General Question     Subject: PATIENT MAY HAVE LEFT HER HEADPHONES AT HER APPT TODAY.  Patient and /or Facility Request: Amarilis Myers   Contact Number: [phone]

## 2024-10-23 ENCOUNTER — PROCEDURE VISIT (OUTPATIENT)
Dept: NEUROLOGY | Age: 54
End: 2024-10-23

## 2024-10-23 DIAGNOSIS — R20.0 HAND NUMBNESS: ICD-10-CM

## 2024-10-23 NOTE — PATIENT INSTRUCTIONS
Verbal consent was obtained from patient and/or patient's advocate for in office procedure with Dr. Terry Bustos MD (EMG or EEG).

## 2024-10-23 NOTE — PROGRESS NOTES
Dear Peyman Tucker MD  [address]  [address]  [place] [address]    I had the pleasure of seeing your patient Amarilis Myers  today for EMG and NCV. I have attached a detailed summary below:        Electromyography report        [facility]  [address], [address]  [place] [address]      Patient: Amarilis Myers    MR Number: [id number]  YOB: 1970  Date of Visit: 10/23/2024    Clinical history:  The patient is a 54 y.o. years old female with 2 to 3-month history of left upper extremity paresthesia and elbow pain.  On exam: No sensory deficit or weakness.  No atrophy of intrinsic hand muscle with 2+ DTRs.    Findings:   For full details about such findings, please see attached report. Needle examination was recorded using monopolar needle in selected muscles. Unless otherwise noted, the temperature of the limb was monitored and maintained between 32-36°C during the performance of the NCV testing.       1.  Left median sensory distal latency and amplitude were within normal limits.  2.  Left ulnar sensory distal latency and amplitude were within normal limits  3.  Left median motor distal latency, amplitude and conduction velocities were within normal limits.  4.  Left ulnar motor distal latency, amplitudes and conduction velocities were within normal limits.  5.  Left radial sensory distal latency and amplitude were within normal limits.  6.  Needle examinations of the left upper extremity was performed in selected muscles. Needle examination of these selected muscle showed normal insertional activities, morphology, amplitude, and recruitment of motor unit potential.        Impression:    This test is within normal limits. The electrophysiological pattern showed no evidence of polyneuropathy, plexopathy, or radiculopathy.      Terry Veras MD    Diplomate of the American board of electrodiagnostics.

## 2024-11-20 DIAGNOSIS — R05.1 ACUTE COUGH: ICD-10-CM

## 2024-11-20 DIAGNOSIS — R06.2 WHEEZING: ICD-10-CM

## 2024-11-20 RX ORDER — ALBUTEROL SULFATE 90 UG/1
2 INHALANT RESPIRATORY (INHALATION) EVERY 6 HOURS PRN
Qty: 8.5 G | Refills: 2 | Status: SHIPPED | OUTPATIENT
Start: 2024-11-20

## 2024-11-20 NOTE — TELEPHONE ENCOUNTER
Medication:   Requested Prescriptions     Pending Prescriptions Disp Refills    albuterol sulfate HFA (PROVENTIL;VENTOLIN;PROAIR) 108 (90 Base) MCG/ACT inhaler [Pharmacy Med Name: ALBUTEROL HFA INH (200 PUFFS) 8.5GM] 8.5 g      Sig: INHALE 2 PUFFS INTO THE LUNGS EVERY 6 HOURS AS NEEDED FOR WHEEZING       Last Filled:  10/31/23    Patient Phone Number: [phone] (home)     Last appt: 6/3/2024   Next appt: Visit date not found    Last Labs DM: No results found for: \"LABA1C\"  Last Lipid:   Lab Results   Component Value Date/Time    CHOL 179 06/29/2022 09:22 AM    TRIG 36 06/29/2022 09:22 AM    HDL 69 06/29/2022 09:22 AM     Last PSA: No results found for: \"PSA\"  Last Thyroid: No results found for: \"TSH\", \"FT3\", \"Y5JTCXR\", \"T4FREE\"

## 2025-03-18 RX ORDER — METFORMIN HYDROCHLORIDE 500 MG/1
500 TABLET, EXTENDED RELEASE ORAL DAILY
Qty: 90 TABLET | Refills: 0 | Status: SHIPPED | OUTPATIENT
Start: 2025-03-18

## 2025-03-18 NOTE — TELEPHONE ENCOUNTER
Medication:   Requested Prescriptions     Pending Prescriptions Disp Refills    metFORMIN (GLUCOPHAGE-XR) 500 MG extended release tablet [Pharmacy Med Name: METFORMIN ER 500MG 24HR TABS] 90 tablet      Sig: TAKE 1 TABLET BY MOUTH DAILY WITH BREAKFAST        Last Filled:  6/3/24    Pended to:  [id number] [address] - P [phone] - F [phone]     Patient Phone Number: [phone] (home)     Last appt: 6/3/2024   Next appt: Visit date not found    Last OARRS:        No data to display

## 2025-04-21 ENCOUNTER — OFFICE VISIT (OUTPATIENT)
Dept: FAMILY MEDICINE CLINIC | Age: 55
End: 2025-04-21
Payer: COMMERCIAL

## 2025-04-21 VITALS
OXYGEN SATURATION: 98 % | BODY MASS INDEX: 29.62 KG/M2 | DIASTOLIC BLOOD PRESSURE: 76 MMHG | WEIGHT: 167.2 LBS | HEIGHT: 63 IN | SYSTOLIC BLOOD PRESSURE: 122 MMHG | HEART RATE: 65 BPM

## 2025-04-21 DIAGNOSIS — Z00.00 WELL ADULT EXAM: Primary | ICD-10-CM

## 2025-04-21 DIAGNOSIS — Z12.11 COLON CANCER SCREENING: ICD-10-CM

## 2025-04-21 PROCEDURE — 99396 PREV VISIT EST AGE 40-64: CPT | Performed by: FAMILY MEDICINE

## 2025-04-21 RX ORDER — TOPIRAMATE 25 MG/1
25 CAPSULE, EXTENDED RELEASE ORAL DAILY
Qty: 30 CAPSULE | Refills: 5 | Status: SHIPPED | OUTPATIENT
Start: 2025-04-21

## 2025-04-21 RX ORDER — NALTREXONE HYDROCHLORIDE 50 MG/1
50 TABLET, FILM COATED ORAL DAILY
Qty: 30 TABLET | Refills: 2 | Status: SHIPPED | OUTPATIENT
Start: 2025-04-21

## 2025-04-21 SDOH — ECONOMIC STABILITY: FOOD INSECURITY: WITHIN THE PAST 12 MONTHS, YOU WORRIED THAT YOUR FOOD WOULD RUN OUT BEFORE YOU GOT MONEY TO BUY MORE.: NEVER TRUE

## 2025-04-21 SDOH — ECONOMIC STABILITY: FOOD INSECURITY: WITHIN THE PAST 12 MONTHS, THE FOOD YOU BOUGHT JUST DIDN'T LAST AND YOU DIDN'T HAVE MONEY TO GET MORE.: NEVER TRUE

## 2025-04-21 ASSESSMENT — PATIENT HEALTH QUESTIONNAIRE - PHQ9
2. FEELING DOWN, DEPRESSED OR HOPELESS: NOT AT ALL
SUM OF ALL RESPONSES TO PHQ QUESTIONS 1-9: 0
1. LITTLE INTEREST OR PLEASURE IN DOING THINGS: NOT AT ALL
SUM OF ALL RESPONSES TO PHQ QUESTIONS 1-9: 0

## 2025-04-21 NOTE — PROGRESS NOTES
Amarilis Myers (:  1970) is a 54 y.o. female,Established patient, here for evaluation of the following chief complaint(s):  Check-Up      Assessment & Plan   ASSESSMENT/PLAN:  Amarilis was seen today for check-up.    Diagnoses and all orders for this visit:    Well adult exam  Reviewed diet and exercise  Interested in naltrexone for wt loss. Will try 1/4 to 1/2.  Cervix removed with hysterectomy so pap not needed  Colon cancer screening  -     Fecal DNA Colorectal cancer screening (Cologuard)    Migraines  Not well controlled.   Will retry topamax  -     naltrexone (DEPADE) 50 MG tablet; Take 1 tablet by mouth daily  -     topiramate ER (TROKENDI XR) 25 MG CP24; Take 25 mg by mouth daily         No follow-ups on file.         Subjective   SUBJECTIVE/OBJECTIVE:  HPI  Pt is a of 54 y.o. female comes in today with   Chief Complaint   Patient presents with    Check-Up     blood work normal at work recently  Metformin hasn't helped for wt loss.  Migraines 1-2 x/week. Excedrin helps.    Vitals:    25 0805   BP: 122/76   Pulse: 65   SpO2: 98%   Weight: 75.8 kg (167 lb 3.2 oz)   Height: 1.6 m (5' 3\")        Past Medical History:Reviewed  Medications:Reviewed.  No Known Allergies   Social hx:Reviewed.  Social History     Tobacco Use   Smoking Status Never   Smokeless Tobacco Never        Review of Systems       Objective   Physical Exam  Constitutional:       General: She is not in acute distress.     Appearance: She is well-developed. She is not diaphoretic.   HENT:      Head: Normocephalic and atraumatic.   Eyes:      General: No scleral icterus.  Neck:      Thyroid: No thyroid mass or thyromegaly.      Trachea: No tracheal deviation.   Cardiovascular:      Rate and Rhythm: Normal rate and regular rhythm.      Heart sounds: Normal heart sounds. No murmur heard.  Pulmonary:      Effort: Pulmonary effort is normal.      Breath sounds: Normal breath sounds.   Musculoskeletal:      Cervical back: Normal range

## 2025-08-12 ENCOUNTER — OFFICE VISIT (OUTPATIENT)
Dept: FAMILY MEDICINE CLINIC | Age: 55
End: 2025-08-12
Payer: COMMERCIAL

## 2025-08-12 VITALS
HEART RATE: 74 BPM | HEIGHT: 63 IN | DIASTOLIC BLOOD PRESSURE: 72 MMHG | TEMPERATURE: 97.7 F | SYSTOLIC BLOOD PRESSURE: 106 MMHG | WEIGHT: 170.6 LBS | BODY MASS INDEX: 30.23 KG/M2

## 2025-08-12 DIAGNOSIS — M76.31 IT BAND SYNDROME, RIGHT: Primary | ICD-10-CM

## 2025-08-12 PROCEDURE — 99213 OFFICE O/P EST LOW 20 MIN: CPT | Performed by: FAMILY MEDICINE

## 2025-08-12 PROCEDURE — G8427 DOCREV CUR MEDS BY ELIG CLIN: HCPCS | Performed by: FAMILY MEDICINE

## 2025-08-12 PROCEDURE — 1036F TOBACCO NON-USER: CPT | Performed by: FAMILY MEDICINE

## 2025-08-12 PROCEDURE — 3017F COLORECTAL CA SCREEN DOC REV: CPT | Performed by: FAMILY MEDICINE

## 2025-08-12 PROCEDURE — G8417 CALC BMI ABV UP PARAM F/U: HCPCS | Performed by: FAMILY MEDICINE

## 2025-08-13 ENCOUNTER — PATIENT MESSAGE (OUTPATIENT)
Dept: FAMILY MEDICINE CLINIC | Age: 55
End: 2025-08-13

## 2025-08-21 ENCOUNTER — TELEPHONE (OUTPATIENT)
Dept: ADMINISTRATIVE | Age: 55
End: 2025-08-21